# Patient Record
Sex: FEMALE | Race: AMERICAN INDIAN OR ALASKA NATIVE | NOT HISPANIC OR LATINO | Employment: FULL TIME | URBAN - METROPOLITAN AREA
[De-identification: names, ages, dates, MRNs, and addresses within clinical notes are randomized per-mention and may not be internally consistent; named-entity substitution may affect disease eponyms.]

---

## 2018-01-19 ENCOUNTER — APPOINTMENT (OUTPATIENT)
Dept: SOCIAL WORK | Facility: CLINIC | Age: 43
End: 2018-01-19
Payer: COMMERCIAL

## 2018-01-19 ENCOUNTER — HOSPITAL ENCOUNTER (OUTPATIENT)
Facility: MEDICAL CENTER | Age: 43
End: 2018-01-19
Payer: COMMERCIAL

## 2018-01-19 LAB
ALBUMIN SERPL BCP-MCNC: 4.3 G/DL (ref 3.2–4.9)
ALBUMIN/GLOB SERPL: 1.7 G/DL
ALP SERPL-CCNC: 38 U/L (ref 30–99)
ALT SERPL-CCNC: 22 U/L (ref 2–50)
ANION GAP SERPL CALC-SCNC: 5 MMOL/L (ref 0–11.9)
AST SERPL-CCNC: 24 U/L (ref 12–45)
BDY FAT % MEASURED: 18 %
BILIRUB SERPL-MCNC: 0.4 MG/DL (ref 0.1–1.5)
BP DIAS: 60 MMHG
BP SYS: 102 MMHG
BUN SERPL-MCNC: 11 MG/DL (ref 8–22)
CALCIUM SERPL-MCNC: 8.5 MG/DL (ref 8.5–10.5)
CHLORIDE SERPL-SCNC: 105 MMOL/L (ref 96–112)
CHOLEST SERPL-MCNC: 187 MG/DL (ref 100–199)
CO2 SERPL-SCNC: 27 MMOL/L (ref 20–33)
CREAT SERPL-MCNC: 0.81 MG/DL (ref 0.5–1.4)
DIABETES HTDIA: NO
EVENT NAME HTEVT: NORMAL
GLOBULIN SER CALC-MCNC: 2.6 G/DL (ref 1.9–3.5)
GLUCOSE SERPL-MCNC: 90 MG/DL (ref 65–99)
HDLC SERPL-MCNC: 48 MG/DL
HYPERTENSION HTHYP: NO
LDLC SERPL CALC-MCNC: 121 MG/DL
POTASSIUM SERPL-SCNC: 4.3 MMOL/L (ref 3.6–5.5)
PROT SERPL-MCNC: 6.9 G/DL (ref 6–8.2)
SCREENING LOC CITY HTCIT: NORMAL
SCREENING LOC STATE HTSTA: NORMAL
SCREENING LOCATION HTLOC: NORMAL
SODIUM SERPL-SCNC: 137 MMOL/L (ref 135–145)
SUBSCRIBER ID HTSID: NORMAL
TRIGL SERPL-MCNC: 90 MG/DL (ref 0–149)

## 2018-01-19 PROCEDURE — 90471 IMMUNIZATION ADMIN: CPT | Performed by: REGISTERED NURSE

## 2018-01-19 PROCEDURE — 90686 IIV4 VACC NO PRSV 0.5 ML IM: CPT | Performed by: REGISTERED NURSE

## 2018-02-27 ENCOUNTER — APPOINTMENT (OUTPATIENT)
Dept: RADIOLOGY | Facility: IMAGING CENTER | Age: 43
End: 2018-02-27
Attending: PHYSICIAN ASSISTANT
Payer: COMMERCIAL

## 2018-02-27 ENCOUNTER — OCCUPATIONAL MEDICINE (OUTPATIENT)
Dept: URGENT CARE | Facility: CLINIC | Age: 43
End: 2018-02-27
Payer: COMMERCIAL

## 2018-02-27 VITALS
OXYGEN SATURATION: 96 % | DIASTOLIC BLOOD PRESSURE: 70 MMHG | SYSTOLIC BLOOD PRESSURE: 110 MMHG | RESPIRATION RATE: 18 BRPM | HEART RATE: 104 BPM | TEMPERATURE: 98.9 F

## 2018-02-27 DIAGNOSIS — S50.11XA CONTUSION OF RIGHT ELBOW AND FOREARM, INITIAL ENCOUNTER: Primary | ICD-10-CM

## 2018-02-27 DIAGNOSIS — S50.11XA CONTUSION OF RIGHT ELBOW AND FOREARM, INITIAL ENCOUNTER: ICD-10-CM

## 2018-02-27 DIAGNOSIS — S39.012A LUMBOSACRAL STRAIN, INITIAL ENCOUNTER: ICD-10-CM
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
## 2018-02-27 DIAGNOSIS — S39.92XA LUMBOSACRAL INJURY, INITIAL ENCOUNTER: ICD-10-CM
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
## 2018-02-27 PROCEDURE — 72220 X-RAY EXAM SACRUM TAILBONE: CPT | Mod: TC | Performed by: PHYSICIAN ASSISTANT

## 2018-02-27 PROCEDURE — 73080 X-RAY EXAM OF ELBOW: CPT | Mod: TC,RT | Performed by: PHYSICIAN ASSISTANT

## 2018-02-27 PROCEDURE — 99203 OFFICE O/P NEW LOW 30 MIN: CPT | Mod: 29 | Performed by: PHYSICIAN ASSISTANT

## 2018-02-27 PROCEDURE — 72100 X-RAY EXAM L-S SPINE 2/3 VWS: CPT | Mod: TC | Performed by: PHYSICIAN ASSISTANT

## 2018-02-27 NOTE — LETTER
[facility]  [address] [place] [address]  Phone:  [phone] - Fax:  [phone]   [facility] Progress Report and Disability Certification  Date of Service: 2/27/2018   No Show:  No  Date / Time of Next Visit: 3/2/2018 @ 1:50 PM    Claim Information   Patient Name: Savita Russell  Claim Number:     Employer: [employer]  Date of Injury: 2/27/2018     Insurer / TPA: [employer]  ID / SSN:     Occupation: Facilitator   Diagnosis: The primary encounter diagnosis was Contusion of right elbow and forearm, initial encounter. Diagnoses of Lumbosacral injury, initial encounter, Fall due to slipping on ice or snow, initial encounter, and Work related injury were also pertinent to this visit.    Medical Information   Related to Industrial Injury? Yes  Comments:Pt fell on ice in work parking lot causing injury    Subjective Complaints:  Date of Injury: 2/27/2018    Hours Injury: 6:45 AM    Pt states while getting out of her truck in her work parking lot to go into work she fell on ice and injured her right elbow, lower back and tailbone regions. Pt states the fall was mechanical due to the ice and no head or neck injuries or LOC. Pt has not taken any Rx medications for this condition. Pt states the pain is a 7/10, aching in nature and worse right now. Pt denies CP, SOB, NVD, paresthesias,  dizziness, change in vision, hives, or other joint pain. The pt's medication list, problem list, and allergies have been evaluated and reviewed during today's visit. Pt denies second job.     Objective Findings: Right elbow: Upon inspection, mild ecchymoses, mild edema, no erythema. +TTP about olecranon region, +AROM, +SILT, STR 5/5, pulses 2+ B/L   Lower back: Upon inspection, no ecchymoses, no edema, no erythema. +TTP about paraspinal muscles of lumbar region and coccyx pain with palpation, BLE: +AROM, +SILT, STR 5/5, DP 2+ B/L        Pre-Existing  Condition(s):     Assessment:   Initial Visit    Status: Additional Care Required  Permanent Disability:No    Plan: Medication  Comments:OTC ibuprofen    Diagnostics: X-ray  Comments:NEG    Comments:       Disability Information   Status: Released to Restricted Duty    From:  2018  Through: 3/2/2018 Restrictions are:     Physical Restrictions   Sitting:    Standing:  < or = to 4 hrs/day Stooping:    Bending:      Squattin hrs/day Walking:  < or = to 4 hrs/day Climbin hrs/day Pushin hrs/day  Comments:right arm only   Pullin hrs/day  Comments:right arm only Other:    Reaching Above Shoulder (L):   Reaching Above Shoulder (R):       Reaching Below Shoulder (L):    Reaching Below Shoulder (R):      Not to exceed Weight Limits   Carrying(hrs):   Weight Limit(lb): < or = to 10 pounds  Comments:right arm only Lifting(hrs):   Weight  Limit(lb): < or = to 10 pounds  Comments:right arm only   Comments: 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 Pt to take breaks if walking or standing more than 1 hour at a time.    Repetitive Actions   Hands: i.e. Fine Manipulations from Grasping: < or = to 2 hrs/day  Comments:right arm only   Feet: i.e. Operating Foot Controls:     Driving / Operate Machinery:     Physician Name: Leela Willett P.A.-C. Physician Signature: LEELA Mehta P.A.-C. e-Signature: Dr. Phu Mirza, Medical Director   Clinic Name / Location: [address] [address] Clinic Phone Number: Dept: [phone]   Appointment Time: 9:45 Am Visit Start Time: 9:59 AM   Check-In Time:  9:45 Am Visit Discharge Time: 10:57 AM    Original-Treating Physician or Chiropractor    Page 2-Insurer/TPA    Page 3-Employer    Page 4-Employee

## 2018-02-27 NOTE — PROGRESS NOTES
Subjective:      Pt is a 42 y.o. female who presents with Fall (NEW WC DOI 2/27/2018 lower back and (R) elbow)      Date of Injury: 2/27/2018    Hours Injury: 6:45 AM    Pt states while getting out of her truck in her work parking lot to go into work she fell on ice and injured her right elbow, lower back and tailbone regions. Pt states the fall was mechanical due to the ice and no head or neck injuries or LOC. Pt has not taken any Rx medications for this condition. Pt states the pain is a 7/10, aching in nature and worse right now. Pt denies CP, SOB, NVD, paresthesias,  dizziness, change in vision, hives, or other joint pain. The pt's medication list, problem list, and allergies have been evaluated and reviewed during today's visit.   Pt denies second job.     HPI  PMH:  Negative per pt.      PSH:  Negative per pt.       Fam Hx:  the patient's family history is not pertinent to their current complaint      Soc HX:  Social History     Social History   • Marital status: Unknown     Spouse name: N/A   • Number of children: N/A   • Years of education: N/A     Occupational History   • Not on file.     Social History Main Topics   • Smoking status: Not on file   • Smokeless tobacco: Not on file   • Alcohol use Not on file   • Drug use: Unknown   • Sexual activity: Not on file     Other Topics Concern   • Not on file     Social History Narrative   • No narrative on file         Medications:  No current outpatient prescriptions on file.      Allergies:  Patient has no known allergies.    ROS    Constitutional: Negative for fever, chills and malaise/fatigue.   HENT: Negative for congestion and sore throat.    Eyes: Negative for blurred vision, double vision and photophobia.   Respiratory: Negative for cough and shortness of breath.    Cardiovascular: Negative for chest pain and palpitations.   Gastrointestinal: Negative for heartburn, nausea, vomiting, abdominal pain, diarrhea and constipation.   Genitourinary: Negative for  dysuria and flank pain.   Musculoskeletal: POS for right elbow and lower back joint pain and myalgias.   Skin: Negative for itching and rash.   Neurological: Negative for dizziness, tingling and headaches.   Endo/Heme/Allergies: Does not bruise/bleed easily.   Psychiatric/Behavioral: Negative for depression. The patient is not nervous/anxious.         Objective:     /70   Pulse (!) 104   Temp 37.2 °C (98.9 °F)   Resp 18   SpO2 96%      Physical Exam    Right elbow: Upon inspection, mild ecchymoses, mild edema, no erythema. +TTP about olecranon region, +AROM, +SILT, STR 5/5, pulses 2+ B/L   Lower back: Upon inspection, no ecchymoses, no edema, no erythema. +TTP about paraspinal muscles of lumbar region and coccyx pain with palpation, BLE: +AROM, +SILT, STR 5/5, DP 2+ B/L       Constitutional: PT is oriented to person, place, and time. PT appears well-developed and well-nourished. No distress.   HENT:   Head: Normocephalic and atraumatic.   Mouth/Throat: Oropharynx is clear and moist. No oropharyngeal exudate.   Eyes: Conjunctivae normal and EOM are normal. Pupils are equal, round, and reactive to light.   Neck: Normal range of motion. Neck supple. No thyromegaly present.   Cardiovascular: Normal rate, regular rhythm, normal heart sounds and intact distal pulses.  Exam reveals no gallop and no friction rub.    No murmur heard.  Pulmonary/Chest: Effort normal and breath sounds normal. No respiratory distress. PT has no wheezes. PT has no rales. Pt exhibits no tenderness.   Abdominal: Soft. Bowel sounds are normal. PT exhibits no distension and no mass. There is no tenderness. There is no rebound and no guarding.   Neurological: PT is alert and oriented to person, place, and time. PT has normal reflexes. No cranial nerve deficit.   Skin: Skin is warm and dry. No rash noted. PT is not diaphoretic. No erythema.       Psychiatric: PT has a normal mood and affect. PT behavior is normal. Judgment and thought  content normal.      RADS:  Narrative     2/27/2018 10:07 AM    HISTORY/REASON FOR EXAM:  Pelvic pain. Slip and fall on ice.      TECHNIQUE/EXAM DESCRIPTION AND NUMBER OF VIEWS:  3 views of the sacrum and coccyx.    COMPARISON: None.    FINDINGS:  There are no fractures or dislocations.  No blastic or lytic lesions.  The sacral neural arches are intact. There are pelvic phleboliths.   Impression     Negative sacrum and coccyx exam.      Reading Provider Reading Date   Leon Soler M.D. Feb 27, 2018      Signing Provider Signing Date Signing Time   Leon Soler M.D. Feb 27, 2018 10:42 AM   Narrative     2/27/2018 10:07 AM    HISTORY/REASON FOR EXAM:  Pain Following Trauma  Fall on ice    TECHNIQUE/ EXAM DESCRIPTION AND NUMBER OF VIEWS:  3 views of the lumbar spine.    COMPARISON: None.    FINDINGS:  There are 5 nonrib-bearing lumbar vertebra. No compression fracture is identified. There is mild intervertebral disc space narrowing at L5/S1. There is minimal endplate spurring. Alignment of the facet joints is maintained. SI joints are symmetric.   Calcific densities in the pelvis likely represent phleboliths.   Impression     No fracture or subluxation is identified.    Minimal degenerative changes.      Reading Provider Reading Date   Shaye Kim M.D. Feb 27, 2018      Signing Provider Signing Date Signing Time   Shaye Kim M.D. Feb 27, 2018 10:41 AM   Narrative     2/27/2018 10:07 AM    HISTORY/REASON FOR EXAM: Right sided elbow pain. Slip and fall on ice.      TECHNIQUE/EXAM DESCRIPTION AND NUMBER OF VIEWS:  3 views of the RIGHT elbow.    COMPARISON:    FINDINGS: Bone mineralization is normal. There is no evidence of fracture or dislocation. Soft tissues are normal. There is a bone island seen involving the distal humeral diaphysis.   Impression     No evidence of acute fracture or dislocation.      Reading Provider Reading Date   Leon Soler M.D. Feb 27, 2018      Signing Provider Signing Date  Signing Time   Leon Soler M.D. Feb 27, 2018 10:40 AM         Assessment/Plan:     1. Contusion of right elbow and forearm, initial encounter    - DX-ELBOW-COMPLETE 3+ RIGHT; Future    2. Lumbosacral strain    - DX-SACRUM AND COCCYX 2+; Future  - DX-LUMBAR SPINE-2 OR 3 VIEWS; Future    3. Fall due to slipping on ice or snow, initial encounter    - DX-SACRUM AND COCCYX 2+; Future  - DX-LUMBAR SPINE-2 OR 3 VIEWS; Future  - DX-ELBOW-COMPLETE 3+ RIGHT; Future    4. Work related injury    - DX-SACRUM AND COCCYX 2+; Future  - DX-LUMBAR SPINE-2 OR 3 VIEWS; Future  - DX-ELBOW-COMPLETE 3+ RIGHT; Future    Ace wrap to right elbow region for compression  RICE therapy discussed  Gentle ROM exercises discussed  WBAT RUE  Ice/heat therapy discussed  OTC ibuprofen for pain control  Rest, fluids encouraged.  AVS with medical info given.  Pt was in full understanding and agreement with the plan.  Follow-up 3 days for next WC appt

## 2018-02-27 NOTE — LETTER
EMPLOYEE’S CLAIM FOR COMPENSATION/ REPORT OF INITIAL TREATMENT  FORM C-4    EMPLOYEE’S CLAIM - PROVIDE ALL INFORMATION REQUESTED   First Name  Savita Last Name  Drew Birthdate                    1975                Sex  female Claim Number   Home Address  [address] Age  42 y.o. 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 (if applicable)  Slipped and fell on ice    How did this injury or occupational disease occur? (Be specific an answer in detail. Use additional sheet if necessary)  Slipped and fell on ice    If you believe that you have an occupational disease, when did you first have knowledge of the disability and it relationship to your employment?  n/a Witnesses to the Accident  n/a      Nature of Injury or Occupational Disease  Defer  Part(s) of Body Injured or Affected  Elbow (R), Buttocks, Lower Back Area (Lumbar Area & Lumbo-Sacral)    I certify that the above is true and correct to the best of my knowledge and that I have provided this information in order to obtain the benefits of [place]  I hereby authorize any physician, chiropractor, surgeon, practitioner, or other person, any hospital, including [facility] or [facility] hospital, any medical service organization, any insurance company, or other institution or organization to release to each other, any medical or other information, including benefits paid or payable, pertinent to this injury or disease, except information relative to diagnosis, treatment and/or counseling for AIDS, psychological conditions, alcohol or controlled substances, for which I must give specific authorization.  A Photostat of this authorization shall be as valid as the original.     Date   Place   Employee’s Signature   THIS REPORT MUST BE COMPLETED AND MAILED WITHIN 3 WORKING DAYS OF TREATMENT   Place  [facility]  Name of Facility  [facility]   Date  2/27/2018 Diagnosis  (S50.11XA) Contusion of right elbow and forearm, initial encounter  (primary encounter diagnosis)  (S39.92XA) Lumbosacral injury, initial encounter  (W00.9XXA) Fall due to slipping on ice or snow, initial encounter  (Y99.0) Work related injury Is there evidence the injured employee was under the influence of alcohol and/or another controlled substance at the time of accident?   Hour  9:59 AM Description of Injury or Disease  The primary encounter diagnosis was Contusion of right elbow and forearm, initial encounter. Diagnoses of Lumbosacral injury, initial encounter, Fall due to slipping on ice or snow, initial encounter, and Work related injury were also pertinent to this visit. No   Treatment  Rest, ice, gentle ROM exercises, Ace wrap to right elbow, OTC ibuprofen  Have you advised the patient to remain off work five days or more? No   X-Ray Findings  Negative   If Yes   From Date  To Date      From information given by the employee, together with medical evidence, can you directly connect this injury or  "occupational disease as job incurred?  Yes  Comments:pt slipped on ice in work parking lot If No Full Duty  No Modified Duty  Yes   Is additional medical care by a physician indicated?  Yes If Modified Duty, Specify any Limitations / Restrictions  SEE RESTRICTIONS   Do you know of any previous injury or disease contributing to this condition or occupational disease?                            No   Date  2/27/2018 Print Doctor’s Name Leela Willett P.A.-C. I certify the employer’s copy of  this form was mailed on:   Address  [address]     [place] Zip  [address]    Provider’s Tax ID Number  [id number] Telephone  Dept: [phone]        e-LEELA Maharaj P.A.-C.   e-Signature: Dr. Phu Mirza, Medical Director Degree  [employer]        ORIGINAL-TREATING PHYSICIAN OR CHIROPRACTOR    PAGE 2-INSURER/TPA    PAGE 3-EMPLOYER    PAGE 4-EMPLOYEE             Form C-4 (rev10/07)              BRIEF DESCRIPTION OF RIGHTS AND BENEFITS  (Pursuant to [place]RS 616C.050)    Notice of Injury or Occupational Disease (Incident Report Form C-1): If an injury or occupational disease (OD) arises out of and in the  course of employment, you must provide written notice to your employer as soon as practicable, but no later than 7 days after the accident or  OD. Your employer shall maintain a sufficient supply of the required forms.    Claim for Compensation (Form C-4): If medical treatment is sought, the form C-4 is available at the place of initial treatment. A completed  \"Claim for Compensation\" (Form C-4) must be filed within 90 days after an accident or OD. The treating physician or chiropractor must,  within 3 working days after treatment, complete and mail to the employer, the employer's insurer and third-party , the Claim for  Compensation.    Medical Treatment: If you require medical treatment for your on-the-job injury or OD, you may be required to select a physician " or  chiropractor from a list provided by your workers’ compensation insurer, if it has contracted with an Organization for Managed Care (MCO) or  Preferred Provider Organization (PPO) or providers of health care. If your employer has not entered into a contract with an MCO or PPO, you  may select a physician or chiropractor from the Panel of Physicians and Chiropractors. Any medical costs related to your industrial injury or  OD will be paid by your insurer.    Temporary Total Disability (TTD): If your doctor has certified that you are unable to work for a period of at least 5 consecutive days, or 5  cumulative days in a 20-day period, or places restrictions on you that your employer does not accommodate, you may be entitled to TTD  compensation.    Temporary Partial Disability (TPD): If the wage you receive upon reemployment is less than the compensation for TTD to which you are  entitled, the insurer may be required to pay you TPD compensation to make up the difference. TPD can only be paid for a maximum of 24  months.    Permanent Partial Disability (PPD): When your medical condition is stable and there is an indication of a PPD as a result of your injury or  OD, within 30 days, your insurer must arrange for an evaluation by a rating physician or chiropractor to determine the degree of your PPD. The  amount of your PPD award depends on the date of injury, the results of the PPD evaluation and your age and wage.    Permanent Total Disability (PTD): If you are medically certified by a treating physician or chiropractor as permanently and totally disabled  and have been granted a PTD status by your insurer, you are entitled to receive monthly benefits not to exceed 66 2/3% of your average  monthly wage. The amount of your PTD payments is subject to reduction if you previously received a PPD award.    Vocational Rehabilitation Services: You may be eligible for vocational rehabilitation services if you are unable to  return to the job due to a  permanent physical impairment or permanent restrictions as a result of your injury or occupational disease.    Transportation and Per Sherie Reimbursement: You may be eligible for travel expenses and per sherie associated with medical treatment.    Reopening: You may be able to reopen your claim if your condition worsens after claim closure.    Appeal Process: If you disagree with a written determination issued by the insurer or the insurer does not respond to your request, you may  appeal to the Department of Administration, , by following the instructions contained in your determination letter. You must  appeal the determination within 70 days from the date of the determination letter at [address], [address], [place], [place]  [address], or [address], [place], [place] [address]. If you disagree with the  decision, you may appeal to the  Department of Administration, . You must file your appeal within 30 days from the date of the  decision  letter at [address], [address], [place], [place] [address], or [address] [facility] [address], [place] [address]. If you  disagree with a decision of an , you may file a petition for judicial review with the District Court. You must do so within 30  days of the Appeal Officer’s decision. You may be represented by an  at your own expense or you may contact the [facility] for possible  representation.    [place]  for Injured Workers (NAIW): If you disagree with a  decision, you may request that NAIW represent you  without charge at an  Hearing. For information regarding denial of benefits, you may contact the [facility] at: [address], [address], [place] [address], [phone], or [address] [facility] [address], [place] [address], [phone]    To File a Complaint with the  Division: If you wish to file a complaint with the  of the Division of Industrial Relations (DIR),  please contact the Workers’ Compensation Section, [address], [address], [place], [place] [address], telephone ([phone]) [phone], or  [address], [address], [place], [place] [address], telephone [phone].    For assistance with Workers’ Compensation Issues: you may contact the Office of the Governor Consumer Health Assistance, [address] [place] [address], [address], [place], [place] [address], Toll Free 1-543.110.6560, Web site: http://[employer].nv.us, E-mail  [email or web address].[email or web address].                                                                                                                                                                                                                                   __________________________________________________________________                                                                   _________________                Employee Name / Signature                                                                                                                                                       Date                                                                                                                                                                                                     D-2 (rev. 10/07)

## 2018-02-27 NOTE — LETTER
[facility]  [address] [address]  Phone:  [phone] - Fax:  [phone]   [facility] Progress Report and Disability Certification  Date of Service: 2/27/2018   No Show:  No  Date / Time of Next Visit: 3/2/2018   Claim Information   Patient Name: Savita Russell  Claim Number:     Employer: [employer] *** Date of Injury: 2/27/2018     Insurer / TPA: [employer] *** ID / SSN:     Occupation: Facilitator  *** Diagnosis: The primary encounter diagnosis was Contusion of right elbow and forearm, initial encounter. Diagnoses of Lumbosacral strain, initial encounter, Fall due to slipping on ice or snow, initial encounter, and Work related injury were also pertinent to this visit.    Medical Information   Related to Industrial Injury? Yes  Comments:Pt fell on ice in work parking lot causing injury ***   Subjective Complaints:  Date of Injury: 2/27/2018    Hours Injury: 6:45 AM    Pt states while getting out of her truck in her work parking lot to go into work she fell on ice and injured her right elbow, lower back and tailbone regions. Pt states the fall was mechanical due to the ice and no head or neck injuries or LOC. Pt has not taken any Rx medications for this condition. Pt states the pain is a 7/10, aching in nature and worse right now. Pt denies CP, SOB, NVD, paresthesias,  dizziness, change in vision, hives, or other joint pain. The pt's medication list, problem list, and allergies have been evaluated and reviewed during today's visit. Pt denies second job.     Objective Findings: Right elbow: Upon inspection, mild ecchymoses, mild edema, no erythema. +TTP about olecranon region, +AROM, +SILT, STR 5/5, pulses 2+ B/L   Lower back: Upon inspection, no ecchymoses, no edema, no erythema. +TTP about paraspinal muscles of lumbar region and coccyx pain with palpation, BLE: +AROM, +SILT, STR 5/5, DP 2+ B/L        Pre-Existing  Condition(s):     Assessment:   Initial Visit    Status: Additional Care Required  Permanent Disability:No    Plan: Medication  Comments:OTC ibuprofen    Diagnostics: X-ray  Comments:NEG    Comments:       Disability Information   Status: Released to Restricted Duty    From:  2018  Through: 3/2/2018 Restrictions are:     Physical Restrictions   Sitting:    Standing:  < or = to 4 hrs/day Stooping:    Bending:      Squattin hrs/day Walking:  < or = to 4 hrs/day Climbin hrs/day Pushin hrs/day  Comments:right arm only   Pullin hrs/day  Comments:right arm only Other:    Reaching Above Shoulder (L):   Reaching Above Shoulder (R):       Reaching Below Shoulder (L):    Reaching Below Shoulder (R):      Not to exceed Weight Limits   Carrying(hrs):   Weight Limit(lb): < or = to 10 pounds  Comments:right arm only Lifting(hrs):   Weight  Limit(lb): < or = to 10 pounds  Comments:right arm only   Comments: 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 Pt to take breaks if walking or standing more than 1 hour at a time.    Repetitive Actions   Hands: i.e. Fine Manipulations from Grasping: < or = to 2 hrs/day  Comments:right arm only   Feet: i.e. Operating Foot Controls:     Driving / Operate Machinery:     Physician Name: Leela Willett P.A.-C. Physician Signature: LEELA Mehta P.A.-C. e-Signature: Dr. Phu Mirza, Medical Director   Clinic Name / Location: [address] [address] Clinic Phone Number: Dept: [phone]   Appointment Time: 9:45 Am Visit Start Time: 9:59 AM   Check-In Time:  9:45 Am Visit Discharge Time: 2:14 Pm ***   Original-Treating Physician or Chiropractor    Page 2-Insurer/TPA    Page 3-Employer    Page 4-Employee

## 2018-03-01 ENCOUNTER — OFFICE VISIT (OUTPATIENT)
Dept: MEDICAL GROUP | Facility: MEDICAL CENTER | Age: 43
End: 2018-03-01
Payer: COMMERCIAL

## 2018-03-01 VITALS
DIASTOLIC BLOOD PRESSURE: 78 MMHG | RESPIRATION RATE: 16 BRPM | HEART RATE: 94 BPM | SYSTOLIC BLOOD PRESSURE: 114 MMHG | OXYGEN SATURATION: 98 % | WEIGHT: 118.8 LBS | TEMPERATURE: 99.1 F

## 2018-03-01 DIAGNOSIS — G43.709 CHRONIC MIGRAINE WITHOUT AURA WITHOUT STATUS MIGRAINOSUS, NOT INTRACTABLE: ICD-10-CM

## 2018-03-01 DIAGNOSIS — K58.0 IRRITABLE BOWEL SYNDROME WITH DIARRHEA: ICD-10-CM

## 2018-03-01 DIAGNOSIS — Z76.89 ENCOUNTER TO ESTABLISH CARE: ICD-10-CM

## 2018-03-01 DIAGNOSIS — Z80.3 FAMILY HISTORY OF BREAST CANCER: ICD-10-CM

## 2018-03-01 DIAGNOSIS — Z80.41 FAMILY HISTORY OF OVARIAN CANCER: ICD-10-CM

## 2018-03-01 DIAGNOSIS — Z00.00 PREVENTATIVE HEALTH CARE: ICD-10-CM

## 2018-03-01 PROCEDURE — 99204 OFFICE O/P NEW MOD 45 MIN: CPT | Performed by: PHYSICIAN ASSISTANT

## 2018-03-01 RX ORDER — IBUPROFEN 800 MG/1
800 TABLET ORAL EVERY 8 HOURS PRN
Qty: 40 TAB | Refills: 1 | Status: SHIPPED | OUTPATIENT
Start: 2018-03-01 | End: 2018-07-23 | Stop reason: SDUPTHER

## 2018-03-01 RX ORDER — SUMATRIPTAN 100 MG/1
100 TABLET, FILM COATED ORAL
Qty: 10 TAB | Refills: 11 | Status: SHIPPED | OUTPATIENT
Start: 2018-03-01

## 2018-03-02 DIAGNOSIS — F51.01 PRIMARY INSOMNIA: ICD-10-CM

## 2018-03-02 RX ORDER — TRAZODONE HYDROCHLORIDE 50 MG/1
50 TABLET ORAL
Qty: 30 TAB | Refills: 3 | Status: SHIPPED | OUTPATIENT
Start: 2018-03-02 | End: 2018-11-13

## 2018-03-02 NOTE — ASSESSMENT & PLAN NOTE
This is a 42-year-old female accompanied by her , Toy. She is here today to establish care. She has a chronic history of migraines. Migraines may be associated with an aura or she may have vertigo with the room flips. She has a history of taking Imitrex 100 mg as needed. Medication is not 100% effective. In the past she was on Topamax 50 mg twice a day but the medication had side effects. She and her  recently moved here from [place]. They live in [place]. They're originally from [place].

## 2018-03-02 NOTE — ASSESSMENT & PLAN NOTE
She also complains of a chronic history of IBS. Usually symptoms began after she eats. She made once a day. Has diarrhea. Denies any abdominal pain. Refuses to get a colonoscopy.

## 2018-03-02 NOTE — ASSESSMENT & PLAN NOTE
Mother had breast cancer as well as other cancers among the female side. Sister had ovarian cancer. Mother also had ovarian cancer. BRCA testing is unknown. She refuses to get a mammogram as well as Pap smears. She states she has fibrocystic changes in her breast and mammograms are painful.

## 2018-03-02 NOTE — PROGRESS NOTES
Subjective:   Savita Russell is a 42 y.o. female here today for establishing care and chronic migraines.    Chronic migraine without aura without status migrainosus, not intractable  This is a 42-year-old female accompanied by her , Toy. She is here today to establish care. She has a chronic history of migraines. Migraines may be associated with an aura or she may have vertigo with the room flips. She has a history of taking Imitrex 100 mg as needed. Medication is not 100% effective. In the past she was on Topamax 50 mg twice a day but the medication had side effects. She and her  recently moved here from [place]. They live in [place]. They're originally from [place].    Irritable bowel syndrome with diarrhea  She also complains of a chronic history of IBS. Usually symptoms began after she eats. She made once a day. Has diarrhea. Denies any abdominal pain. Refuses to get a colonoscopy.    Family history of breast cancer  Mother had breast cancer as well as other cancers among the female side. Sister had ovarian cancer. Mother also had ovarian cancer. BRCA testing is unknown. She refuses to get a mammogram as well as Pap smears. She states she has fibrocystic changes in her breast and mammograms are painful.       Current medicines (including changes today)  Current Outpatient Prescriptions   Medication Sig Dispense Refill   • sumatriptan (IMITREX) 100 MG tablet Take 1 Tab by mouth Once PRN for Migraine for up to 1 dose. 10 Tab 11   • ibuprofen (MOTRIN) 800 MG Tab Take 1 Tab by mouth every 8 hours as needed. With food. 40 Tab 1     No current facility-administered medications for this visit.      She  has no past medical history on file.    Social History and Family History were reviewed and updated.    ROS   No chest pain, no shortness of breath, no abdominal pain and all other systems were reviewed and are negative.       Objective:     Blood pressure 114/78, pulse 94, temperature 37.3 °C  (99.1 °F), resp. rate 16, weight 53.9 kg (118 lb 12.8 oz), SpO2 98 %. There is no height or weight on file to calculate BMI.   Physical Exam:  Constitutional: Alert, no distress.  Skin: Warm, dry, good turgor, no rashes in visible areas.  Eye: Equal, round and reactive, conjunctiva clear, lids normal.  ENMT: Lips without lesions, good dentition, oropharynx clear.  Neck: Trachea midline, no masses.   Lymph: No cervical or supraclavicular lymphadenopathy  Respiratory: Unlabored respiratory effort, lungs appear clear, no wheezes.  Cardiovascular: Normal S1, S2, no murmur, no edema.  Psych: Alert and oriented x3, normal affect and mood.        Assessment and Plan:   The following treatment plan was discussed    1. Chronic migraine without aura without status migrainosus, not intractable  Chronic condition. Provided prescription for Imitrex 100 mg as directed. Also given a prescription for ibuprofen with food as needed.  - sumatriptan (IMITREX) 100 MG tablet; Take 1 Tab by mouth Once PRN for Migraine for up to 1 dose.  Dispense: 10 Tab; Refill: 11  - ibuprofen (MOTRIN) 800 MG Tab; Take 1 Tab by mouth every 8 hours as needed. With food.  Dispense: 40 Tab; Refill: 1    2. Irritable bowel syndrome with diarrhea  Chronic condition. Stable. We'll monitor.    3. Family history of ovarian cancer  Order BRCA1 and BRCA2 testing. Advised to contact insurance first about coverage.  - BRCA1 and BRCA2) Sequencing and Deletion/Duplication; Future    4. Family history of breast cancer  Ordered BRCA1 and BRCA2 testing. Advised to contact insurance first about coverage.  - BRCA1 and BRCA2) Sequencing and Deletion/Duplication; Future    5. Preventative health care  Ordered mammogram.  - MA-SCREEN MAMMO W/CAD-BILAT; Future    6. Encounter to establish care      Followup: Return if symptoms worsen or fail to improve.    Please note that this dictation was created using voice recognition software. I have made every reasonable attempt to  correct obvious errors, but I expect that there are errors of grammar and possibly content that I did not discover before finalizing the note.

## 2018-03-03 ENCOUNTER — OCCUPATIONAL MEDICINE (OUTPATIENT)
Dept: URGENT CARE | Facility: CLINIC | Age: 43
End: 2018-03-03
Payer: COMMERCIAL

## 2018-03-03 VITALS
SYSTOLIC BLOOD PRESSURE: 118 MMHG | HEIGHT: 65 IN | DIASTOLIC BLOOD PRESSURE: 62 MMHG | BODY MASS INDEX: 19.66 KG/M2 | OXYGEN SATURATION: 97 % | WEIGHT: 118 LBS | HEART RATE: 70 BPM | TEMPERATURE: 99.4 F
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
## 2018-03-03 PROCEDURE — 99202 OFFICE O/P NEW SF 15 MIN: CPT | Performed by: FAMILY MEDICINE

## 2018-03-03 NOTE — LETTER
[facility]  [address] [place] [address]  Phone:  [phone] - Fax:  [phone]   [facility] Progress Report and Disability Certification  Date of Service: 3/3/2018   No Show:  No  Date / Time of Next Visit:  MMI   Claim Information   Patient Name: Savita Russell  Claim Number:     Employer: [employer]  Date of Injury: 2/27/2018     Insurer / TPA: [employer]  ID / SSN:     Occupation: Facilitator   Diagnosis: The encounter diagnosis was Fall due to ice or snow, subsequent encounter.    Medical Information   Related to Industrial Injury? Yes    Subjective Complaints:  Date of Injury: 2/27/2018   Hours Injury: 6:45 AM  Pt states while getting out of her truck in her work parking lot to go into work she fell on ice and injured her right elbow, lower back and tailbone regions. Pt states the fall was mechanical due to the ice and no head or neck injuries or LOC. Pt has not taken any Rx medications for this condition. Pt states the pain is a 7/10, aching in nature and worse right now. Pt denies CP, SOB, NVD, paresthesias,  dizziness, change in vision, hives, or other joint pain. The pt's medication list, problem list, and allergies have been evaluated and reviewed during today's visit.  Pt denies second job.   Follow-up 3/3/2018 patient endorse complete resolution of pain, stiffness. Denies any competitions from injuries at this time. Patient would like to return to work.   Objective Findings: Full range of motion bilateral upper extremities. Neurovascular intact bilateral upper extremities. Full range of motion throughout back. No tenderness to palpation bilateral upper extremities or back.    Pre-Existing Condition(s):     Assessment:   Condition Improved    Status: Discharged /  MMI  Permanent Disability:No    Plan:      Diagnostics:      Comments:       Disability Information   Status: Released to Full Duty    From:        Through:   Restrictions are:     Physical Restrictions   Sitting:    Standing:    Stooping:    Bending:      Squatting:    Walking:    Climbing:    Pushing:      Pulling:    Other:    Reaching Above Shoulder (L):   Reaching Above Shoulder (R):       Reaching Below Shoulder (L):    Reaching Below Shoulder (R):      Not to exceed Weight Limits   Carrying(hrs):   Weight Limit(lb):   Lifting(hrs):   Weight  Limit(lb):     Comments:      Repetitive Actions   Hands: i.e. Fine Manipulations from Grasping:     Feet: i.e. Operating Foot Controls:     Driving / Operate Machinery:     Physician Name: [facility] Physician Signature: MARCELLE Ashby M.D. e-Signature: Dr. Phu Mirza, Medical Director   Clinic Name / Location: [address] [place] [address] Clinic Phone Number: Dept: [phone]   Appointment Time: 2:00 Pm Visit Start Time: 2:12 PM   Check-In Time:  1:54 Pm Visit Discharge Time:  2:41 PM   Original-Treating Physician or Chiropractor    Page 2-Insurer/TPA    Page 3-Employer    Page 4-Employee

## 2018-03-10 NOTE — PROGRESS NOTES
"Subjective:   Savita Russell is a 42 y.o. female who presents for Follow-Up ( follow up for R elbow and back, feels much better would like to be cleared to go back to work )    Date of Injury: 2/27/2018   Hours Injury: 6:45 AM  Pt states while getting out of her truck in her work parking lot to go into work she fell on ice and injured her right elbow, lower back and tailbone regions. Pt states the fall was mechanical due to the ice and no head or neck injuries or LOC. Pt has not taken any Rx medications for this condition. Pt states the pain is a 7/10, aching in nature and worse right now. Pt denies CP, SOB, NVD, paresthesias,  dizziness, change in vision, hives, or other joint pain. The pt's medication list, problem list, and allergies have been evaluated and reviewed during today's visit.  Pt denies second job.   Follow-up 3/3/2018 patient endorse complete resolution of pain, stiffness. Denies any competitions from injuries at this time. Patient would like to return to work.   HPI  ROS  Allergies   Allergen Reactions   • Demerol      Flat line   • Morphine      Starts to see things    • Trazodone      Hard time breathing, head aches   • Vicodin [Hydrocodone-Acetaminophen]      Hives, rash       Objective:   /62   Pulse 70   Temp 37.4 °C (99.4 °F)   Ht 1.651 m (5' 5\")   Wt 53.5 kg (118 lb)   SpO2 97%   BMI 19.64 kg/m²   Physical Exam   Constitutional: She is oriented to person, place, and time. She appears well-developed and well-nourished. No distress.   Eyes: EOM are normal. Pupils are equal, round, and reactive to light.   Cardiovascular: Normal rate, regular rhythm, normal heart sounds and intact distal pulses.    Pulmonary/Chest: Effort normal and breath sounds normal. No respiratory distress.   Abdominal: Soft. Bowel sounds are normal. She exhibits no distension.   Musculoskeletal: Normal range of motion.   Neurological: She is alert and oriented to person, place, and time. She has " normal reflexes.   Skin: Skin is warm and dry.   Psychiatric: She has a normal mood and affect. Her behavior is normal.     Full range of motion bilateral upper extremities. Neurovascular intact bilateral upper extremities. Full range of motion throughout back. No tenderness to palpation bilateral upper extremities or back.    Assessment/Plan:   Assessment    1. Fall due to ice or snow, subsequent encounter  Differential diagnosis, natural history, supportive care, and indications for immediate follow-up discussed.  Use over-the-counter pain reliever, such as acetaminophen (Tylenol), ibuprofen (Advil, Motrin) or naproxen (Aleve) as needed; follow package directions for dosing.

## 2018-07-23 ENCOUNTER — PATIENT MESSAGE (OUTPATIENT)
Dept: MEDICAL GROUP | Facility: MEDICAL CENTER | Age: 43
End: 2018-07-23

## 2018-07-23 DIAGNOSIS — G43.709 CHRONIC MIGRAINE WITHOUT AURA WITHOUT STATUS MIGRAINOSUS, NOT INTRACTABLE: ICD-10-CM

## 2018-07-23 RX ORDER — IBUPROFEN 800 MG/1
800 TABLET ORAL EVERY 8 HOURS PRN
Qty: 40 TAB | Refills: 1 | Status: SHIPPED | OUTPATIENT
Start: 2018-07-23 | End: 2019-02-03 | Stop reason: SDUPTHER

## 2018-10-05 ENCOUNTER — IMMUNIZATION (OUTPATIENT)
Dept: SOCIAL WORK | Facility: CLINIC | Age: 43
End: 2018-10-05
Payer: COMMERCIAL

## 2018-10-05 DIAGNOSIS — Z23 NEED FOR VACCINATION: ICD-10-CM

## 2018-10-05 PROCEDURE — 90686 IIV4 VACC NO PRSV 0.5 ML IM: CPT | Performed by: REGISTERED NURSE

## 2018-10-05 PROCEDURE — 90471 IMMUNIZATION ADMIN: CPT | Performed by: REGISTERED NURSE

## 2018-10-16 ENCOUNTER — TELEPHONE (OUTPATIENT)
Dept: MEDICAL GROUP | Facility: MEDICAL CENTER | Age: 43
End: 2018-10-16

## 2018-10-23 ENCOUNTER — APPOINTMENT (OUTPATIENT)
Dept: MEDICAL GROUP | Facility: MEDICAL CENTER | Age: 43
End: 2018-10-23
Payer: COMMERCIAL

## 2018-11-13 ENCOUNTER — OFFICE VISIT (OUTPATIENT)
Dept: MEDICAL GROUP | Facility: MEDICAL CENTER | Age: 43
End: 2018-11-13
Payer: COMMERCIAL

## 2018-11-13 VITALS
TEMPERATURE: 99.8 F | WEIGHT: 119 LBS | DIASTOLIC BLOOD PRESSURE: 66 MMHG | BODY MASS INDEX: 19.83 KG/M2 | OXYGEN SATURATION: 97 % | SYSTOLIC BLOOD PRESSURE: 120 MMHG | HEART RATE: 79 BPM | HEIGHT: 65 IN

## 2018-11-13 DIAGNOSIS — F51.01 PRIMARY INSOMNIA: ICD-10-CM

## 2018-11-13 DIAGNOSIS — F17.200 TOBACCO DEPENDENCE: ICD-10-CM

## 2018-11-13 PROCEDURE — 99214 OFFICE O/P EST MOD 30 MIN: CPT | Performed by: PHYSICIAN ASSISTANT

## 2018-11-13 RX ORDER — BUPROPION HYDROCHLORIDE 150 MG/1
150 TABLET, EXTENDED RELEASE ORAL 2 TIMES DAILY
Qty: 60 TAB | Refills: 5 | Status: SHIPPED | OUTPATIENT
Start: 2018-11-13 | End: 2020-06-05 | Stop reason: SDUPTHER

## 2018-11-13 RX ORDER — TEMAZEPAM 15 MG/1
15 CAPSULE ORAL NIGHTLY PRN
Qty: 30 CAP | Refills: 0 | Status: SHIPPED | OUTPATIENT
Start: 2018-11-13 | End: 2018-12-13

## 2018-11-13 ASSESSMENT — PATIENT HEALTH QUESTIONNAIRE - PHQ9
CLINICAL INTERPRETATION OF PHQ2 SCORE: 2
SUM OF ALL RESPONSES TO PHQ QUESTIONS 1-9: 4
5. POOR APPETITE OR OVEREATING: 0 - NOT AT ALL

## 2018-11-14 NOTE — ASSESSMENT & PLAN NOTE
This is a 42-year-old female accompanied by her , Ramesh.  She is here today to discuss insomnia.  Chronic history.  I provided her trazodone and it caused a massive headache.  In the past she was on Ambien 5 mg but that caused side effects.  Currently she is on Unisom and takes multiple tablets before it does any effect.  She may sleep for a couple hours and then wakes up.  She complains of worsening stress over the past few weeks with her  having this chest pain.  Likely cardiac related.

## 2018-11-14 NOTE — PROGRESS NOTES
"Subjective:   Savita Russell is a 42 y.o. female here today for insomnia and tobacco dependence.    Primary insomnia  This is a 42-year-old female accompanied by her , Ramesh.  She is here today to discuss insomnia.  Chronic history.  I provided her trazodone and it caused a massive headache.  In the past she was on Ambien 5 mg but that caused side effects.  Currently she is on Unisom and takes multiple tablets before it does any effect.  She may sleep for a couple hours and then wakes up.  She complains of worsening stress over the past few weeks with her  having this chest pain.  Likely cardiac related.    Tobacco dependence  Chronic history of smoking.  Would like to stop.  She would like to try Wellbutrin.  In the past she was on it and it helped reduce her urge.       Current medicines (including changes today)  Current Outpatient Prescriptions   Medication Sig Dispense Refill   • buPROPion SR (WELLBUTRIN-SR) 150 MG TABLET SR 12 HR sustained-release tablet Take 1 Tab by mouth 2 times a day. First 3 days take one tablet daily. 60 Tab 5   • temazepam (RESTORIL) 15 MG Cap Take 1 Cap by mouth at bedtime as needed for Sleep for up to 30 days. 30 Cap 0   • ibuprofen (MOTRIN) 800 MG Tab Take 1 Tab by mouth every 8 hours as needed. With food. 40 Tab 1   • sumatriptan (IMITREX) 100 MG tablet Take 1 Tab by mouth Once PRN for Migraine for up to 1 dose. 10 Tab 11     No current facility-administered medications for this visit.      She  has no past medical history on file.    Social History and Family History were reviewed and updated.    ROS   No chest pain, no shortness of breath, no abdominal pain and all other systems were reviewed and are negative.       Objective:     Blood pressure 120/66, pulse 79, temperature 37.7 °C (99.8 °F), height 1.651 m (5' 5\"), weight 54 kg (119 lb), SpO2 97 %, not currently breastfeeding. Body mass index is 19.8 kg/m².   Physical Exam:  Constitutional: Alert, no " distress.  Skin: Warm, dry, good turgor, no rashes in visible areas.  Eye: Equal, round and reactive, conjunctiva clear, lids normal.  ENMT: Lips without lesions, good dentition, oropharynx clear.  Neck: Trachea midline, no masses.   Lymph: No cervical or supraclavicular lymphadenopathy  Respiratory: Unlabored respiratory effort, lungs clear to auscultation, no wheezes, no ronchi.  Cardiovascular: Normal S1, S2, no murmur, no edema.  Psych: Alert and oriented x3, normal affect and mood.        Assessment and Plan:   The following treatment plan was discussed    1. Primary insomnia  Chronic condition.  Provided Restoril 15 mg capsules as directed.  Discussed medication may become habit forming.  Referred also to Dr. Gregoria berrios for further evaluation.  Ordered TSH with reflex to free T4.  Must see me in follow-up in 1 month to discuss.  - temazepam (RESTORIL) 15 MG Cap; Take 1 Cap by mouth at bedtime as needed for Sleep for up to 30 days.  Dispense: 30 Cap; Refill: 0  - REFERRAL TO BEHAVIORAL HEALTH  - TSH WITH REFLEX TO FT4; Future    2. Tobacco dependence  Chronic condition.  Wellbutrin 150 mg started as directed.  Follow-up in 1 month.  - buPROPion SR (WELLBUTRIN-SR) 150 MG TABLET SR 12 HR sustained-release tablet; Take 1 Tab by mouth 2 times a day. First 3 days take one tablet daily.  Dispense: 60 Tab; Refill: 5      Followup: Return in about 4 weeks (around 12/11/2018).    Please note that this dictation was created using voice recognition software. I have made every reasonable attempt to correct obvious errors, but I expect that there are errors of grammar and possibly content that I did not discover before finalizing the note.

## 2018-11-14 NOTE — ASSESSMENT & PLAN NOTE
Chronic history of smoking.  Would like to stop.  She would like to try Wellbutrin.  In the past she was on it and it helped reduce her urge.

## 2019-02-03 DIAGNOSIS — G43.709 CHRONIC MIGRAINE WITHOUT AURA WITHOUT STATUS MIGRAINOSUS, NOT INTRACTABLE: ICD-10-CM

## 2019-02-04 RX ORDER — IBUPROFEN 800 MG/1
TABLET ORAL
Qty: 40 TAB | Refills: 0 | Status: SHIPPED | OUTPATIENT
Start: 2019-02-04 | End: 2019-03-22 | Stop reason: SDUPTHER

## 2019-03-22 DIAGNOSIS — G43.709 CHRONIC MIGRAINE WITHOUT AURA WITHOUT STATUS MIGRAINOSUS, NOT INTRACTABLE: ICD-10-CM

## 2019-03-25 RX ORDER — IBUPROFEN 800 MG/1
TABLET ORAL
Qty: 40 TAB | Refills: 0 | Status: SHIPPED | OUTPATIENT
Start: 2019-03-25 | End: 2019-07-07 | Stop reason: SDUPTHER

## 2019-07-07 DIAGNOSIS — G43.709 CHRONIC MIGRAINE WITHOUT AURA WITHOUT STATUS MIGRAINOSUS, NOT INTRACTABLE: ICD-10-CM

## 2019-07-07 RX ORDER — IBUPROFEN 800 MG/1
TABLET ORAL
Qty: 40 TAB | Refills: 0 | Status: SHIPPED | OUTPATIENT
Start: 2019-07-07 | End: 2019-10-16 | Stop reason: SDUPTHER

## 2019-10-16 DIAGNOSIS — G43.709 CHRONIC MIGRAINE WITHOUT AURA WITHOUT STATUS MIGRAINOSUS, NOT INTRACTABLE: ICD-10-CM

## 2019-10-16 RX ORDER — IBUPROFEN 800 MG/1
TABLET ORAL
Qty: 40 TAB | Refills: 1 | Status: SHIPPED | OUTPATIENT
Start: 2019-10-16 | End: 2020-04-12

## 2020-06-05 ENCOUNTER — OFFICE VISIT (OUTPATIENT)
Dept: MEDICAL GROUP | Facility: MEDICAL CENTER | Age: 45
End: 2020-06-05
Payer: COMMERCIAL

## 2020-06-05 VITALS
TEMPERATURE: 99.6 F | HEIGHT: 66 IN | HEART RATE: 85 BPM | DIASTOLIC BLOOD PRESSURE: 66 MMHG | WEIGHT: 117.73 LBS | BODY MASS INDEX: 18.92 KG/M2 | RESPIRATION RATE: 16 BRPM | SYSTOLIC BLOOD PRESSURE: 98 MMHG | OXYGEN SATURATION: 96 %

## 2020-06-05 DIAGNOSIS — F51.01 PRIMARY INSOMNIA: ICD-10-CM

## 2020-06-05 DIAGNOSIS — E78.00 ELEVATED LDL CHOLESTEROL LEVEL: ICD-10-CM

## 2020-06-05 DIAGNOSIS — G43.709 CHRONIC MIGRAINE WITHOUT AURA WITHOUT STATUS MIGRAINOSUS, NOT INTRACTABLE: ICD-10-CM

## 2020-06-05 DIAGNOSIS — N63.10 LUMP OF RIGHT BREAST: ICD-10-CM

## 2020-06-05 DIAGNOSIS — Z76.89 ENCOUNTER TO ESTABLISH CARE: ICD-10-CM

## 2020-06-05 DIAGNOSIS — D22.9 NUMEROUS MOLES: ICD-10-CM

## 2020-06-05 DIAGNOSIS — K13.70 ORAL LESION: ICD-10-CM

## 2020-06-05 DIAGNOSIS — Z80.3 FAMILY HISTORY OF MALIGNANT NEOPLASM OF BREAST: ICD-10-CM

## 2020-06-05 DIAGNOSIS — Z80.41 FAMILY HISTORY OF MALIGNANT NEOPLASM OF OVARY: ICD-10-CM

## 2020-06-05 DIAGNOSIS — F17.200 TOBACCO DEPENDENCE: ICD-10-CM

## 2020-06-05 PROCEDURE — 99214 OFFICE O/P EST MOD 30 MIN: CPT | Performed by: FAMILY MEDICINE

## 2020-06-05 RX ORDER — AMITRIPTYLINE HYDROCHLORIDE 10 MG/1
10 TABLET, FILM COATED ORAL
Qty: 60 TAB | Refills: 0 | Status: SHIPPED
Start: 2020-06-05 | End: 2021-05-05

## 2020-06-05 RX ORDER — BUPROPION HYDROCHLORIDE 150 MG/1
150 TABLET, EXTENDED RELEASE ORAL 2 TIMES DAILY
Qty: 60 TAB | Refills: 5 | Status: SHIPPED
Start: 2020-06-05 | End: 2021-05-05

## 2020-06-05 ASSESSMENT — ENCOUNTER SYMPTOMS
WEIGHT LOSS: 0
CHILLS: 0
EYE REDNESS: 0
INSOMNIA: 1
MYALGIAS: 0
CONSTIPATION: 0
SENSORY CHANGE: 0
WHEEZING: 0
FOCAL WEAKNESS: 0
COUGH: 0
EYE PAIN: 0
NAUSEA: 0
FEVER: 0
SHORTNESS OF BREATH: 0
PALPITATIONS: 0
VOMITING: 0
DEPRESSION: 0
SPUTUM PRODUCTION: 0
DIARRHEA: 0
ABDOMINAL PAIN: 0
DIZZINESS: 0
SINUS PAIN: 0
HEADACHES: 0
HEMOPTYSIS: 0
EYE DISCHARGE: 0
NERVOUS/ANXIOUS: 0

## 2020-06-05 ASSESSMENT — LIFESTYLE VARIABLES: SUBSTANCE_ABUSE: 0

## 2020-06-05 ASSESSMENT — PATIENT HEALTH QUESTIONNAIRE - PHQ9: CLINICAL INTERPRETATION OF PHQ2 SCORE: 0

## 2020-06-05 NOTE — ASSESSMENT & PLAN NOTE
This is a new problem for this patient.  Patient reports that she was fine until since she noticed 1 lesion on the right side and 1 lesion on the floor of her tongue.  Dentist referred her to oral surgeon, she saw oral surgeon and he recommended to follow-up with physician.  She reports that the lesion which is on the floor of the tongue is hard, nontender.  And the lesion on the right side of her cheek and in the front side is whitish in color.  She reports that she does not chew on  her cheeks.

## 2020-06-05 NOTE — ASSESSMENT & PLAN NOTE
This is a new problem for this patient.  She reports that she noticed a lump in her right breast.  She reports that she was playing with her dog talking to purpose and she felt pain in her right breast and when she palpated she felt the lump.  She denies any previous history of breast cancer.  Her last mammogram was normal as per patient.  She does have significant family history of breast cancer in mother, and grandmother, and family history of ovarian cancer in mother and sister.  She denies any breast discharge, nipple changes, skin changes.

## 2020-06-05 NOTE — ASSESSMENT & PLAN NOTE
"This is a chronic problem for this patient. Patient is smoking 1 ppd a day. She has been smoking for 30 years.  She was prescribed Wellbutrin at last visit\" 18.  Patient reports that she never got this prescription from her pharmacy.  She is interested in taking Wellbutrin for smoking cessation.  "

## 2020-06-05 NOTE — ASSESSMENT & PLAN NOTE
This is a chronic problem for this patient.  She reports that she had multiple moles over her back.  Her  noticed that 3 of them were getting bigger in size.  She reports that she could not tell if they are getting bigger in size or change in color because they are on her back.

## 2020-06-05 NOTE — PROGRESS NOTES
"FAMILY MEDICINE VISIT                                                               Chief complaint::Diagnoses of Encounter to establish care, Lump of right breast, Family history of ovarian cancer, Family history of breast cancer, Numerous moles, Tobacco dependence, Primary insomnia, Chronic migraine without aura without status migrainosus, not intractable, Elevated LDL cholesterol level, and Oral lesion were pertinent to this visit.    History of present illness: Savita Russell is a 44 y.o. female who presented to [facility], acute complaint of lump of right breast, oral lesion, insomnia, migraine headaches.    Lump of right breast  This is a new problem for this patient.  She reports that she noticed a lump in her right breast.  She reports that she was playing with her dog talking to purpose and she felt pain in her right breast and when she palpated she felt the lump.  She denies any previous history of breast cancer.  Her last mammogram was normal as per patient.  She does have significant family history of breast cancer in mother, and grandmother, and family history of ovarian cancer in mother and sister.  She denies any breast discharge, nipple changes, skin changes.    Chronic migraine without aura without status migrainosus, not intractable  This is a chronic problem for this patient.  New to me.  She reports that she uses Imitrex and Motrin 2-3 times in a week.  She really gets bad headache.  She has not tried any prophylactic medication before.  She is interested in talking about prophylactic medication.    Tobacco dependence  This is a chronic problem for this patient. Patient is smoking 1 ppd a day. She has been smoking for 30 years.  She was prescribed Wellbutrin at last visit\" 18.  Patient reports that she never got this prescription from her pharmacy.  She is interested in taking Wellbutrin for smoking cessation.    Primary insomnia  This is a chronic problem for this patient.  New to " me.  Patient reports that she has difficulty going to sleep.  She has tried trazodone in the past which did not work for her.  She is using over-the-counter Unisom for her symptoms.  She reports taking this medication daily.     Oral lesion  This is a new problem for this patient.  Patient reports that she was fine until since she noticed 1 lesion on the right side and 1 lesion on the floor of her tongue.  Dentist referred her to oral surgeon, she saw oral surgeon and he recommended to follow-up with physician.  She reports that the lesion which is on the floor of the tongue is hard, nontender.  And the lesion on the right side of her cheek and in the front side is whitish in color.  She reports that she does not chew on  her cheeks.    Numerous moles  This is a chronic problem for this patient.  She reports that she had multiple moles over her back.  Her  noticed that 3 of them were getting bigger in size.  She reports that she could not tell if they are getting bigger in size or change in color because they are on her back.    Review of systems:     Review of Systems   Constitutional: Negative for chills, fever, malaise/fatigue and weight loss.   HENT: Negative for ear discharge, ear pain, hearing loss and sinus pain.         2 lesions, 1 on right cheek and 1 on the floor of the tongue.   Eyes: Negative for pain, discharge and redness.   Respiratory: Negative for cough, hemoptysis, sputum production, shortness of breath and wheezing.    Cardiovascular: Negative for chest pain, palpitations and leg swelling.   Gastrointestinal: Negative for abdominal pain, constipation, diarrhea, nausea and vomiting.   Genitourinary: Negative for dysuria, frequency and urgency.   Musculoskeletal: Negative for joint pain and myalgias.   Skin: Negative for itching and rash.        Multiple moles on the back, few of them are getting bigger in size as per patient's    Neurological: Negative for dizziness, sensory change,  focal weakness and headaches.        Frequent migraine headaches.   Endo/Heme/Allergies: Negative for environmental allergies.   Psychiatric/Behavioral: Negative for depression, substance abuse and suicidal ideas. The patient has insomnia. The patient is not nervous/anxious.    Right breast lump     Past Medical, Surgical and Family History:    History reviewed. No pertinent past medical history.  Past Surgical History:   Procedure Laterality Date   • ABDOMINAL HYSTERECTOMY TOTAL      one ovary left for hormones   • EYE SURGERY     • PRIMARY C SECTION       Family History   Problem Relation Age of Onset   • Other Mother         Cervical CA   • Cancer Mother         Ovarian CA, breast CA   • Cancer Sister 47        Ovarian CA   • Cancer Maternal Grandmother         breast CA        Social History:    Social History     Tobacco Use   • Smoking status: Current Every Day Smoker     Packs/day: 1.00     Types: Cigarettes   • Smokeless tobacco: Never Used   • Tobacco comment: 1 pack per day for 30 years   Substance Use Topics   • Alcohol use: No   • Drug use: No      Social History     Substance and Sexual Activity   Sexual Activity Yes   • Partners: Male   • Birth control/protection: Surgical    Comment: Work for [employer]       Medications and Allergies:     Current Outpatient Medications   Medication Sig Dispense Refill   • buPROPion SR (WELLBUTRIN-SR) 150 MG TABLET SR 12 HR sustained-release tablet Take 1 Tab by mouth 2 times a day. First 3 days take one tablet daily. 60 Tab 5   • amitriptyline (ELAVIL) 10 MG Tab Take 1 Tab by mouth every bedtime. 60 Tab 0   • ibuprofen (MOTRIN) 800 MG Tab TAKE ONE TABLET BY MOUTH EVERY 8 HOURS WITH FOOD AS NEEDED 40 Tab 0   • sumatriptan (IMITREX) 100 MG tablet Take 1 Tab by mouth Once PRN for Migraine for up to 1 dose. 10 Tab 11     No current facility-administered medications for this visit.         Allergies   Allergen Reactions   • Demerol      Flat line   • Morphine      Starts to see  "things    • Trazodone      Hard time breathing, head aches   • Vicodin [Hydrocodone-Acetaminophen]      Hives, rash        Vitals:    BP (!) 98/66 (BP Location: Left arm, Patient Position: Sitting, BP Cuff Size: Adult)   Pulse 85   Temp 37.6 °C (99.6 °F) (Temporal)   Resp 16   Ht 1.664 m (5' 5.5\")   Wt 53.4 kg (117 lb 11.6 oz)   SpO2 96%  Body mass index is 19.29 kg/m².    Physical Exam:     Physical Exam   Constitutional: She is well-developed, well-nourished, and in no distress. No distress.   HENT:   Head: Normocephalic and atraumatic.   Right Ear: External ear normal.   Left Ear: External ear normal.   Nose: Nose normal.   Mouth/Throat: Oropharynx is clear and moist. No oropharyngeal exudate.   Very small whitish linear lesion over right side of her cheek on the front side.    Raised hard oval mass on floor of tongue on left side.   Eyes: Conjunctivae and EOM are normal. Right eye exhibits no discharge. Left eye exhibits no discharge.   Neck: Neck supple. No thyromegaly present.   Cardiovascular: Normal rate, regular rhythm, normal heart sounds and intact distal pulses.   No murmur heard.  Pulmonary/Chest: Effort normal and breath sounds normal. No respiratory distress. She has no wheezes. She has no rales.   Abdominal: Soft. Bowel sounds are normal. She exhibits no distension. There is no abdominal tenderness. There is no guarding.   Musculoskeletal:         General: No tenderness, deformity or edema.   Neurological: She is alert. She exhibits normal muscle tone. Gait normal.   Skin: Skin is warm. No rash noted. She is not diaphoretic.   Multiple moles at her upper back.  3 of the moles are bigger in size.   Psychiatric: Mood, affect and judgment normal.      Right breast: Tender lump around 1 cm in diameter palpated in  inner quadrant of right breast.  No nipple discharge, no skin changes, no axillary lymphadenopathy.  No mass palpated in left breast.    (Medical assistant: Amanda was present during " examination.)    Assessment/Plan:    Savita was seen today for establish care.    Diagnoses and all orders for this visit:    Encounter to establish care    Lump of right breast:  · Check mammogram diagnostic of palpated abnormality.    -     MA DIAGNOSTIC MAMMO BILAT W/CAD; Future    Family history of ovarian cancer  Family history of breast cancer:    · Discussed with patient about getting genetic testing.  Patient reports that previously she was told that testing is not covered by her insurance.  I discussed with patient about [facility], given pamphlet to her to talk with them about getting genetic testing.  She will also talk to her insurance about this and let me know at next visit.    Numerous moles:  · Referral given to dermatology for suspicious 3 moles which are getting bigger in size.    -     REFERRAL TO DERMATOLOGY    Tobacco dependence:  · Counseled patient regarding smoking.  Prescribed Wellbutrin to help with smoking cessation.    -     buPROPion SR (WELLBUTRIN-SR) 150 MG TABLET SR 12 HR sustained-release tablet; Take 1 Tab by mouth 2 times a day. First 3 days take one tablet daily.    Primary insomnia:  · Discussed with patient about sleep hygiene.  We will try amitriptyline which will help with her migraine and insomnia.  If sleep not better, will refer to sleep medicine for further evaluation.  · Check blood work CBC, CMP, lipid panel and thyroid function test.    -     CBC WITHOUT DIFFERENTIAL; Future  -     TSH; Future  -     TRIIDOTHYRONINE; Future  -     FREE THYROXINE; Future    Chronic migraine without aura without status migrainosus, not intractable:  · Uncontrolled with abortive therapy.  · Discussed with patient about prophylactic therapies like beta-blocker, calcium channel blocker, anticonvulsant and TCA.  · We will try amitriptyline 10 mg at bedtime.  Advised patient to contact us if she has any side effects.    -     amitriptyline (ELAVIL) 10 MG Tab; Take 1 Tab by mouth  every bedtime.    Elevated LDL cholesterol level:  · Last lipid panel showed LDL.  Recheck lipid panel.  Advised healthy diet.    -     Comp Metabolic Panel; Future  -     Lipid Profile; Future    Oral lesion:  · 1 lesion over her right side of her cheek appears biting.  The lesion over the floor of the tongue appears like mucocele.  Discussed with patient that mucocele are benign in nature, refered patient to ENT for removal of that mucocele.    -     REFERRAL TO ENT         Please note that this dictation was created using voice recognition software. I have made every reasonable attempt to correct obvious errors, but I expect that there are errors of grammar and possibly content that I did not discover before finalizing the note.    Follow up in 6 weeks for lab follow-up, follow-up for migraine.

## 2020-06-05 NOTE — ASSESSMENT & PLAN NOTE
This is a chronic problem for this patient.  New to me.  She reports that she uses Imitrex and Motrin 2-3 times in a week.  She really gets bad headache.  She has not tried any prophylactic medication before.  She is interested in talking about prophylactic medication.

## 2020-06-05 NOTE — ASSESSMENT & PLAN NOTE
This is a chronic problem for this patient.  New to me.  Patient reports that she has difficulty going to sleep.  She has tried trazodone in the past which did not work for her.  She is using over-the-counter Unisom for her symptoms.  She reports taking this medication daily.

## 2020-07-21 ENCOUNTER — APPOINTMENT (OUTPATIENT)
Dept: MEDICAL GROUP | Facility: MEDICAL CENTER | Age: 45
End: 2020-07-21
Payer: COMMERCIAL

## 2020-09-21 DIAGNOSIS — G43.709 CHRONIC MIGRAINE WITHOUT AURA WITHOUT STATUS MIGRAINOSUS, NOT INTRACTABLE: ICD-10-CM

## 2020-09-21 RX ORDER — IBUPROFEN 800 MG/1
800 TABLET ORAL EVERY 8 HOURS PRN
Qty: 40 TAB | Refills: 0 | Status: SHIPPED | OUTPATIENT
Start: 2020-09-21 | End: 2021-01-28 | Stop reason: SDUPTHER

## 2020-09-21 NOTE — TELEPHONE ENCOUNTER
Received request via: Patient    Was the patient seen in the last year in this department? Yes    Does the patient have an active prescription (recently filled or refills available) for medication(s) requested? Takes as needed.

## 2020-09-21 NOTE — TELEPHONE ENCOUNTER
I sent Motrin prescription for this patient.  Her last blood work was in May 2018.  Please advise patient to do blood work as ibuprofen can affect her kidneys.  Thank you.

## 2020-09-22 NOTE — TELEPHONE ENCOUNTER
Called patient and left message, informing her that Dr. Villela had refilled her prescription, and wanted her to complete the blood work that was already ordered in the [facility] network.

## 2020-12-07 ENCOUNTER — PATIENT MESSAGE (OUTPATIENT)
Dept: MEDICAL GROUP | Facility: MEDICAL CENTER | Age: 45
End: 2020-12-07

## 2020-12-10 ENCOUNTER — PATIENT MESSAGE (OUTPATIENT)
Dept: MEDICAL GROUP | Facility: MEDICAL CENTER | Age: 45
End: 2020-12-10

## 2020-12-10 DIAGNOSIS — Z01.84 IMMUNITY STATUS TESTING: ICD-10-CM

## 2020-12-11 ENCOUNTER — HOSPITAL ENCOUNTER (OUTPATIENT)
Dept: LAB | Facility: MEDICAL CENTER | Age: 45
End: 2020-12-11
Attending: FAMILY MEDICINE
Payer: COMMERCIAL

## 2020-12-11 DIAGNOSIS — Z01.84 IMMUNITY STATUS TESTING: ICD-10-CM

## 2020-12-11 LAB — HBV SURFACE AB SERPL IA-ACNC: <3.5 MIU/ML (ref 0–10)

## 2020-12-11 PROCEDURE — 86762 RUBELLA ANTIBODY: CPT

## 2020-12-11 PROCEDURE — 86706 HEP B SURFACE ANTIBODY: CPT

## 2020-12-11 PROCEDURE — 86708 HEPATITIS A ANTIBODY: CPT

## 2020-12-11 PROCEDURE — 86735 MUMPS ANTIBODY: CPT

## 2020-12-11 PROCEDURE — 36415 COLL VENOUS BLD VENIPUNCTURE: CPT

## 2020-12-11 PROCEDURE — 86765 RUBEOLA ANTIBODY: CPT

## 2020-12-14 ENCOUNTER — PATIENT MESSAGE (OUTPATIENT)
Dept: MEDICAL GROUP | Facility: MEDICAL CENTER | Age: 45
End: 2020-12-14

## 2020-12-14 DIAGNOSIS — Z01.84 IMMUNITY STATUS TESTING: ICD-10-CM

## 2020-12-14 LAB
HAV AB SER QL IA: NEGATIVE
MEV IGG SER IA-ACNC: 1.48
MUV IGG SER IA-ACNC: 0.9
RUBV AB SER QL: 28.8 IU/ML

## 2020-12-14 NOTE — RESULT ENCOUNTER NOTE
Recent blood work regarding immunity testing for MMR, hepatitis B results released to [facility].  Patient is immune to MMR but not to hepatitis B. Will need 3 dose series at 0-month, 1 to 2 months and greater than 6 months.  [facility] message sent to patient to schedule medical assistant visit to get this vaccine series.

## 2020-12-15 NOTE — RESULT ENCOUNTER NOTE
Results released to [facility].  Hepatitis A antibody came back negative, requires 2 doses 6 months apart.  Advised to schedule medical assistant visit to get this vaccine.

## 2020-12-29 ENCOUNTER — HOSPITAL ENCOUNTER (OUTPATIENT)
Dept: LAB | Facility: MEDICAL CENTER | Age: 45
End: 2020-12-29
Attending: FAMILY MEDICINE
Payer: COMMERCIAL

## 2020-12-29 DIAGNOSIS — Z01.84 IMMUNITY STATUS TESTING: ICD-10-CM

## 2020-12-29 PROCEDURE — 36415 COLL VENOUS BLD VENIPUNCTURE: CPT

## 2020-12-29 PROCEDURE — 86787 VARICELLA-ZOSTER ANTIBODY: CPT

## 2020-12-31 LAB
VZV IGG SER IA-ACNC: 2033 IV
VZV IGM SER IA-ACNC: 0.08 ISR

## 2021-01-04 ENCOUNTER — PATIENT MESSAGE (OUTPATIENT)
Dept: MEDICAL GROUP | Facility: MEDICAL CENTER | Age: 46
End: 2021-01-04

## 2021-01-04 DIAGNOSIS — Z11.1 SCREENING FOR TUBERCULOSIS: ICD-10-CM

## 2021-01-20 ENCOUNTER — HOSPITAL ENCOUNTER (OUTPATIENT)
Dept: LAB | Facility: MEDICAL CENTER | Age: 46
End: 2021-01-20
Attending: FAMILY MEDICINE
Payer: COMMERCIAL

## 2021-01-20 DIAGNOSIS — Z11.1 SCREENING FOR TUBERCULOSIS: ICD-10-CM

## 2021-01-20 PROCEDURE — 36415 COLL VENOUS BLD VENIPUNCTURE: CPT

## 2021-01-20 PROCEDURE — 86480 TB TEST CELL IMMUN MEASURE: CPT

## 2021-01-22 LAB
GAMMA INTERFERON BACKGROUND BLD IA-ACNC: 0.04 IU/ML
M TB IFN-G BLD-IMP: NEGATIVE
M TB IFN-G CD4+ BCKGRND COR BLD-ACNC: -0.01 IU/ML
MITOGEN IGNF BCKGRD COR BLD-ACNC: >10 IU/ML
QFT TB2 - NIL TBQ2: 0 IU/ML

## 2021-01-27 ENCOUNTER — PATIENT MESSAGE (OUTPATIENT)
Dept: MEDICAL GROUP | Facility: MEDICAL CENTER | Age: 46
End: 2021-01-27

## 2021-01-27 DIAGNOSIS — G43.709 CHRONIC MIGRAINE WITHOUT AURA WITHOUT STATUS MIGRAINOSUS, NOT INTRACTABLE: ICD-10-CM

## 2021-01-28 RX ORDER — IBUPROFEN 800 MG/1
800 TABLET ORAL EVERY 8 HOURS PRN
Qty: 40 TAB | Refills: 1 | Status: SHIPPED | OUTPATIENT
Start: 2021-01-28 | End: 2022-03-30

## 2021-05-05 ENCOUNTER — OFFICE VISIT (OUTPATIENT)
Dept: URGENT CARE | Facility: CLINIC | Age: 46
End: 2021-05-05
Payer: COMMERCIAL

## 2021-05-05 ENCOUNTER — NURSE TRIAGE (OUTPATIENT)
Dept: HEALTH INFORMATION MANAGEMENT | Facility: OTHER | Age: 46
End: 2021-05-05

## 2021-05-05 VITALS
HEART RATE: 77 BPM | RESPIRATION RATE: 12 BRPM | HEIGHT: 65 IN | WEIGHT: 109 LBS | SYSTOLIC BLOOD PRESSURE: 108 MMHG | DIASTOLIC BLOOD PRESSURE: 64 MMHG | TEMPERATURE: 99.2 F | OXYGEN SATURATION: 95 % | BODY MASS INDEX: 18.16 KG/M2

## 2021-05-05 DIAGNOSIS — H93.12 TINNITUS OF LEFT EAR: ICD-10-CM

## 2021-05-05 DIAGNOSIS — H91.92 DECREASED HEARING OF LEFT EAR: ICD-10-CM

## 2021-05-05 DIAGNOSIS — H92.02 OTALGIA OF LEFT EAR: ICD-10-CM

## 2021-05-05 PROCEDURE — 99214 OFFICE O/P EST MOD 30 MIN: CPT | Performed by: NURSE PRACTITIONER

## 2021-05-05 RX ORDER — AMOXICILLIN AND CLAVULANATE POTASSIUM 875; 125 MG/1; MG/1
1 TABLET, FILM COATED ORAL 2 TIMES DAILY
Qty: 20 TABLET | Refills: 0 | Status: SHIPPED | OUTPATIENT
Start: 2021-05-05 | End: 2021-05-15

## 2021-05-05 ASSESSMENT — LIFESTYLE VARIABLES: SUBSTANCE_ABUSE: 0

## 2021-05-05 ASSESSMENT — ENCOUNTER SYMPTOMS
FEVER: 0
SORE THROAT: 0
NAUSEA: 0
CHILLS: 0
HEADACHES: 0

## 2021-05-05 NOTE — TELEPHONE ENCOUNTER
Regarding: Left ear   ----- Message from Ashlee Santos sent at 5/5/2021  7:23 AM PDT -----  Left ear tender inside. No other symptoms.

## 2021-05-05 NOTE — TELEPHONE ENCOUNTER
Left ear tenderness and says she cannot hear.  All she hears is throbbing. Sx began 3 days ago.    1. Caller Name: Savita Russell  2.                  Call Back Number: [phone] (home)   3.   [facility] PCP or Specialty Provider: Yes Heath Villela M.D.        2. Has the patient previously tested positive for COVID-19? No    3.  In the last two weeks, has the patient had any new or worsening symptoms (not explained by alternative diagnosis)? No.    4.  Does patient have any comoribidities? None     5.  Has the patient had any known contact with someone who is suspected or confirmed to have COVID-19? No.    5. Disposition: Cleared by RN Triage as potential is low for COVID-19; OK to keep/schedule appointment    Note routed to [facility] Provider: [facility] only.     Reason for Disposition  • Earache  (Exceptions: brief ear pain of < 60 minutes duration, earache occurring during air travel    Additional Information  • Negative: Moving the earlobe or touching the ear clearly increases the pain  • Negative: Foreign body struck in the ear (e.g., bug, piece of cotton)  • Negative: Followed an ear injury  • Negative: [1] Recently diagnosed with otitis media AND [2] currently on oral antibiotics  • Negative: [1] Stiff neck (unable to touch chin to chest) AND [2] fever  • Negative: [1] Bony area of skull behind the ear is pink or swollen AND [2] fever  • Negative: Fever > 104 F (40 C)  • Negative: Patient sounds very sick or weak to the triager  • Negative: [1] SEVERE pain AND [2] not improved 2 hours after taking analgesic medication (e.g., ibuprofen or acetaminophen)  • Negative: Walking is very unsteady  • Negative: Sudden onset of ear pain after long - thin object was inserted into the ear canal (e.g., pencil, Q-tip)  • Negative: Diabetes mellitus or weak immune system (e.g., HIV positive, cancer chemo, splenectomy, organ transplant, chronic steroids)  • Negative: New blurred vision or vision changes  • Negative:  "White, yellow, or green discharge  • Negative: Bloody discharge or unexplained bleeding from ear canal    Answer Assessment - Initial Assessment Questions  1. LOCATION: \"Which ear is involved?\"      Left ear  2. ONSET: \"When did the ear start hurting\"       3 days ago  3. SEVERITY: \"How bad is the pain?\"  (Scale 1-10; mild, moderate or severe)    - MILD (1-3): doesn't interfere with normal activities     - MODERATE (4-7): interferes with normal activities or awakens from sleep     - SEVERE (8-10): excruciating pain, unable to do any normal activities       7/10  4. URI SYMPTOMS: \" Do you have a runny nose or cough?\"      no  5. FEVER: \"Do you have a fever?\" If so, ask: \"What is your temperature, how was it measured, and when did it start?\"      no  6. CAUSE: \"Have you been swimming recently?\", \"How often do you use Q-TIPS?\", \"Have you had any recent air travel or scuba diving?\"      no  7. OTHER SYMPTOMS: \"Do you have any other symptoms?\" (e.g., headache, stiff neck, dizziness, vomiting, runny nose, decreased hearing)      no  8. PREGNANCY: \"Is there any chance you are pregnant?\" \"When was your last menstrual period?\"     no    Protocols used: EARACHE-A-AH      "

## 2021-05-05 NOTE — PROGRESS NOTES
"Savita Russell is a 45 y.o. female who presents for Otalgia (/fullness & pressure LEFT ear x4 days )      HPI This is a new problem.  Savita is a 44 y/o female with c/o Left ear pain \" deep inside' . Initially loss of hearing \" muffled\" sensation with buzzing noise. Overall this is making her not feeling well. Treatment tried:antihistamine without improvement. Pain is sharp and severe.   No other aggravating or alleviating factors.       Review of Systems   Constitutional: Negative for chills, fever and malaise/fatigue.   HENT: Negative for congestion and sore throat.    Gastrointestinal: Negative for nausea.   Neurological: Negative for headaches.   Psychiatric/Behavioral: Negative for substance abuse.       Allergies   Allergen Reactions   • Demerol      Flat line   • Morphine      Starts to see things    • Trazodone      Hard time breathing, head aches   • Vicodin [Hydrocodone-Acetaminophen]      Hives, rash      History reviewed. No pertinent past medical history.  Past Surgical History:   Procedure Laterality Date   • ABDOMINAL HYSTERECTOMY TOTAL      one ovary left for hormones   • EYE SURGERY     • PRIMARY C SECTION       Family History   Problem Relation Age of Onset   • Other Mother         Cervical CA   • Cancer Mother         Ovarian CA, breast CA   • Cancer Sister 47        Ovarian CA   • Cancer Maternal Grandmother         breast CA     Social History     Tobacco Use   • Smoking status: Current Every Day Smoker     Packs/day: 1.00     Types: Cigarettes   • Smokeless tobacco: Never Used   • Tobacco comment: 1 pack per day for 30 years   Substance Use Topics   • Alcohol use: No     Patient Active Problem List   Diagnosis   • Chronic migraine without aura without status migrainosus, not intractable   • Irritable bowel syndrome with diarrhea   • Family history of ovarian cancer   • Family history of breast cancer   • Primary insomnia   • Tobacco dependence   • Lump of right breast   • Elevated " "LDL cholesterol level   • Oral lesion   • Numerous moles     Current Outpatient Medications on File Prior to Visit   Medication Sig Dispense Refill   • ibuprofen (MOTRIN) 800 MG Tab Take 1 Tab by mouth every 8 hours as needed for Moderate Pain or Headache. 40 Tab 1   • sumatriptan (IMITREX) 100 MG tablet Take 1 Tab by mouth Once PRN for Migraine for up to 1 dose. 10 Tab 11     No current facility-administered medications on file prior to visit.          Objective:     /64   Pulse 77   Temp 37.3 °C (99.2 °F) (Temporal)   Resp 12   Ht 1.651 m (5' 5\")   Wt 49.4 kg (109 lb)   SpO2 95%   Breastfeeding No   BMI 18.14 kg/m²     Physical Exam  Vitals and nursing note reviewed.   Constitutional:       Appearance: She is well-developed.   HENT:      Head: Normocephalic.        Right Ear: Hearing normal.      Left Ear: Decreased hearing noted. Tympanic membrane is erythematous (mild visable erythema. TM is not fully visualilzed due to small , tortuous canal. ).   Eyes:      Conjunctiva/sclera: Conjunctivae normal.   Cardiovascular:      Rate and Rhythm: Normal rate and regular rhythm.      Pulses: Normal pulses.      Heart sounds: Normal heart sounds.   Pulmonary:      Effort: Pulmonary effort is normal. No respiratory distress.      Breath sounds: Normal breath sounds.   Musculoskeletal:         General: Normal range of motion.      Cervical back: Normal range of motion and neck supple.   Lymphadenopathy:      Head:      Right side of head: No tonsillar adenopathy.      Left side of head: No tonsillar adenopathy.      Cervical: No cervical adenopathy.      Upper Body:      Right upper body: No supraclavicular adenopathy.      Left upper body: No supraclavicular adenopathy.   Skin:     General: Skin is warm and dry.      Capillary Refill: Capillary refill takes less than 2 seconds.   Neurological:      Mental Status: She is alert and oriented to person, place, and time.      Deep Tendon Reflexes: Reflexes are " normal and symmetric.   Psychiatric:         Mood and Affect: Mood normal.         Speech: Speech normal.         Behavior: Behavior normal.         Thought Content: Thought content normal.         Assessment /Associated Orders:      1. Otalgia of left ear  amoxicillin-clavulanate (AUGMENTIN) 875-125 MG Tab   2. Decreased hearing of left ear  amoxicillin-clavulanate (AUGMENTIN) 875-125 MG Tab   3. Tinnitus of left ear           Medical Decision Making:    Pt is clinically stable at today's acute urgent care visit.  No acute distress noted. Appropriate for outpatient management at this time.   Educated in proper administration of medication(s) ordered today including safety, possible SE, risks, benefits, rationale and alternatives to therapy.   OTC  analgesic of choice (acetaminophen or NSAID). Follow manufactures dosing and safety precautions.   Warm compresses prn pain.   Differential Diagnosis includes but is not limited to: early mastoiditis, AOM - unable to visualize entire TM due to anatomical tight canal, sinusitiis, tinnitus or other. Red Flags dw pt. Verb understanding.     Advised to follow-up with the primary care provider for recheck, reevaluation, and consideration of further management if necessary.   Discussed management options (risks,benefits, and alternatives to treatment). Expressed understanding and the treatment plan was agreed upon. Questions were encouraged and answered       Return to urgent care prn if new or worsening sx or if there is no improvement in condition prn . Red flags discussed and indications to immediately call 911 or present to the Emergency Department.     I personally reviewed prior external notes and test results pertinent to today's visit.  I have independently reviewed and interpreted all diagnostics ordered during this urgent care acute visit.   Time spent evaluating this patient was at least 30 minutes and includes preparing for visit, counseling/education, exam and  evaluation, obtaining history, independent interpretation, ordering lab/test/procedures,medication management and documentation.

## 2022-03-30 ENCOUNTER — OFFICE VISIT (OUTPATIENT)
Dept: MEDICAL GROUP | Facility: MEDICAL CENTER | Age: 47
End: 2022-03-30
Payer: COMMERCIAL

## 2022-03-30 ENCOUNTER — HOSPITAL ENCOUNTER (OUTPATIENT)
Dept: RADIOLOGY | Facility: MEDICAL CENTER | Age: 47
End: 2022-03-30
Attending: FAMILY MEDICINE
Payer: COMMERCIAL

## 2022-03-30 VITALS
HEIGHT: 65 IN | SYSTOLIC BLOOD PRESSURE: 120 MMHG | OXYGEN SATURATION: 99 % | WEIGHT: 116.84 LBS | RESPIRATION RATE: 16 BRPM | TEMPERATURE: 98.5 F | DIASTOLIC BLOOD PRESSURE: 62 MMHG | HEART RATE: 81 BPM | BODY MASS INDEX: 19.47 KG/M2

## 2022-03-30 DIAGNOSIS — R20.2 NUMBNESS AND TINGLING OF UPPER EXTREMITY: ICD-10-CM

## 2022-03-30 DIAGNOSIS — M25.512 ACUTE PAIN OF LEFT SHOULDER: ICD-10-CM

## 2022-03-30 DIAGNOSIS — R20.0 NUMBNESS AND TINGLING OF UPPER EXTREMITY: ICD-10-CM

## 2022-03-30 DIAGNOSIS — Z91.89 AT RISK FOR BREAST CANCER: ICD-10-CM

## 2022-03-30 DIAGNOSIS — D22.9 NUMEROUS MOLES: ICD-10-CM

## 2022-03-30 DIAGNOSIS — Z12.31 ENCOUNTER FOR SCREENING MAMMOGRAM FOR BREAST CANCER: ICD-10-CM

## 2022-03-30 PROCEDURE — 99214 OFFICE O/P EST MOD 30 MIN: CPT | Performed by: FAMILY MEDICINE

## 2022-03-30 PROCEDURE — 73030 X-RAY EXAM OF SHOULDER: CPT | Mod: LT

## 2022-03-30 PROCEDURE — 72040 X-RAY EXAM NECK SPINE 2-3 VW: CPT

## 2022-03-30 RX ORDER — GABAPENTIN 300 MG/1
300 CAPSULE ORAL
Qty: 60 CAPSULE | Refills: 1 | Status: SHIPPED | OUTPATIENT
Start: 2022-03-30 | End: 2024-03-01

## 2022-03-30 RX ORDER — METHYLPREDNISOLONE 4 MG/1
TABLET ORAL
Qty: 21 TABLET | Refills: 0 | Status: SHIPPED | OUTPATIENT
Start: 2022-03-30 | End: 2022-08-12 | Stop reason: SDUPTHER

## 2022-03-30 RX ORDER — IBUPROFEN 800 MG/1
800 TABLET ORAL EVERY 8 HOURS PRN
Qty: 60 TABLET | Refills: 1 | Status: SHIPPED | OUTPATIENT
Start: 2022-03-30 | End: 2022-08-12

## 2022-03-30 ASSESSMENT — ENCOUNTER SYMPTOMS
PALPITATIONS: 0
SHORTNESS OF BREATH: 0
FEVER: 0
CHILLS: 0

## 2022-03-30 ASSESSMENT — PATIENT HEALTH QUESTIONNAIRE - PHQ9: CLINICAL INTERPRETATION OF PHQ2 SCORE: 0

## 2022-03-30 NOTE — PROGRESS NOTES
FAMILY MEDICINE VISIT                                                               Chief complaint::Diagnoses of Acute pain of left shoulder, Numbness and tingling of upper extremity, Encounter for screening mammogram for breast cancer, At risk for breast cancer, and Numerous moles were pertinent to this visit.    History of present illness: Savita Russell is a 46 y.o. female who presented for shoulder pain, numbness and tingling in both upper arms.  She is here with her  today.    She reports that she has been having numbness and tingling in both upper arms since 3 months and started having left shoulder pain for last 2 weeks.  She lift heavy weight around 200 to 250 pounds at work.  Reports that they have been short staffed at work and has been doing more shifts there.  Denies any direct trauma to her neck and shoulder.  Has numbness and tingling in both arms as well as her hands.  No previous history of neck problems.  Has been trying ibuprofen, tried over-the-counter cream, massage, has not been helping with these symptoms.    Reports that has 3 moles at her back which are getting bigger in size. would like further evaluation for that.      Review of systems:     Review of Systems   Constitutional: Negative for chills, fever and malaise/fatigue.   Respiratory: Negative for shortness of breath.    Cardiovascular: Negative for chest pain, palpitations and leg swelling.   Musculoskeletal: Positive for joint pain.        Left shoulder pain   Neurological:        Numbness and tingling of both arms, hands.        Medications and Allergies:     Current Outpatient Medications   Medication Sig Dispense Refill   • ibuprofen (MOTRIN) 800 MG Tab Take 1 Tablet by mouth every 8 hours as needed for Moderate Pain or Headache. 60 Tablet 1   • methylPREDNISolone (MEDROL DOSEPAK) 4 MG Tablet Therapy Pack As directed on the packaging label. 21 Tablet 0   • gabapentin (NEURONTIN) 300 MG Cap Take 1 Capsule by mouth  "at bedtime. 60 Capsule 1   • sumatriptan (IMITREX) 100 MG tablet Take 1 Tab by mouth Once PRN for Migraine for up to 1 dose. 10 Tab 11     No current facility-administered medications for this visit.          Vitals:    /62 (BP Location: Left arm, Patient Position: Sitting, BP Cuff Size: Adult)   Pulse 81   Temp 36.9 °C (98.5 °F)   Resp 16   Ht 1.651 m (5' 5\")   Wt 53 kg (116 lb 13.5 oz)   SpO2 99%  Body mass index is 19.44 kg/m².    Physical Exam:     Physical Exam  Constitutional:       General: She is not in acute distress.  HENT:      Head: Normocephalic and atraumatic.   Eyes:      Conjunctiva/sclera: Conjunctivae normal.   Cardiovascular:      Rate and Rhythm: Normal rate.   Pulmonary:      Effort: Pulmonary effort is normal. No respiratory distress.   Musculoskeletal:         General: No deformity.      Cervical back: Neck supple.      Comments: Left shoulder  General: no gross deformity  ROM: flex 180, extension 50, ER 60, IR equal  Scapula: - winging, - dyskinesia  Palpation: TTP at anterior part of shoulder and at clavicular region.  Significant pain on extension of left arm  Strength: 5/5 abduction, 5/5 ER, 5/5 IR, 5/5 subscapular lift  Rotator Cuff: Empty can -, External rotation lag -  Impingement: Neer’s -, Irvin -  Neuro: Sensation equal and intact bilaterally    Neck: No tenderness on palpation at neck region, full range of motion     Skin:     Comments: 3 moles at upper back which are bigger in size   Neurological:      Mental Status: She is alert.      Gait: Gait is intact.   Psychiatric:         Mood and Affect: Mood and affect normal.         Judgment: Judgment normal.              Assessment/Plan:    1. Acute pain of left shoulder  New health problem, symptoms consistent with tendinitis, start Medrol Dosepak.  Prescribed ibuprofen to be taken as needed.  Recommended not to take ibuprofen and Medrol Dosepak together as that can cause stomach upset and increased risk of bleeding.  " Check left shoulder x-ray and referral to physical therapy.  - ibuprofen (MOTRIN) 800 MG Tab; Take 1 Tablet by mouth every 8 hours as needed for Moderate Pain or Headache.  Dispense: 60 Tablet; Refill: 1  - DX-SHOULDER 2+ LEFT; Future  - Referral to Physical Therapy    2. Numbness and tingling of upper extremity  New health problem, symptoms consistent with nerve compression, check x-ray, referral to physical therapy.  Start gabapentin at bedtime.  Discussed with patient about not lifting heavy weight if possible at work as that can aggravate this condition.  Patient reports understanding.    - methylPREDNISolone (MEDROL DOSEPAK) 4 MG Tablet Therapy Pack; As directed on the packaging label.  Dispense: 21 Tablet; Refill: 0  - gabapentin (NEURONTIN) 300 MG Cap; Take 1 Capsule by mouth at bedtime.  Dispense: 60 Capsule; Refill: 1  - DX-CERVICAL SPINE-2 OR 3 VIEWS; Future  - Referral to Physical Therapy    3. Encounter for screening mammogram for breast cancer  - MA-SCREENING MAMMO BILAT W/TOMOSYNTHESIS W/CAD; Future    4. At risk for breast cancer  , History of cervical, ovarian and breast cancer.  Referral to genetic research studies.    - Referral to Genetic Research Studies    5. Numerous moles  Referral to dermatology for evaluation for 3 bigger moles.    - Referral to Dermatology    Please note that this dictation was created using voice recognition software. I have made every reasonable attempt to correct obvious errors, but I expect that there are errors of grammar and possibly content that I did not discover before finalizing the note.    Follow up in 2 months for follow-up.

## 2022-05-18 ENCOUNTER — OFFICE VISIT (OUTPATIENT)
Dept: DERMATOLOGY | Facility: IMAGING CENTER | Age: 47
End: 2022-05-18
Payer: COMMERCIAL

## 2022-05-18 VITALS — TEMPERATURE: 96.6 F

## 2022-05-18 DIAGNOSIS — L82.1 SK (SEBORRHEIC KERATOSIS): ICD-10-CM

## 2022-05-18 PROCEDURE — 99212 OFFICE O/P EST SF 10 MIN: CPT | Performed by: NURSE PRACTITIONER

## 2022-05-18 NOTE — PROGRESS NOTES
DERMATOLOGY NOTE  NEW VISIT       Chief complaint: Skin Lesion       HPI:  Skin lesion   Location:  Upper back   Time present:  unknown   Painful lesion: No  Itching lesion: No  Enlarging lesion: Yes  Anything make it better or worse?no         History of skin cancer: No  History of precancers/actinic keratoses: No  History of biopsies:No  History of blistering/severe sunburns:No  Family history of skin cancer:Yes, Details: mother melanoma   Family history of atypical moles:No      Allergies   Allergen Reactions   • Demerol      Flat line   • Morphine      Starts to see things    • Trazodone      Hard time breathing, head aches   • Vicodin [Hydrocodone-Acetaminophen]      Hives, rash         MEDICATIONS:  Medications relevant to specialty reviewed.     REVIEW OF SYSTEMS:   Positive for skin (see HPI)  Negative for fevers and chills       EXAM:  There were no vitals taken for this visit.  Constitutional: Well-developed, well-nourished, and in no distress.     A focused skin exam was performed including the affected areas of the back. Notable findings on exam today listed below and/or in assessment/plan.     few medium and dark brown stuck-on waxy papules scattered on the trunk/back      IMPRESSION / PLAN:    1. SK (seborrheic keratosis)  - Benign-appearing nature of lesions discussed during exam.   - Reassurance provided, not bothering pt at this time  - Follow up as needed  - Advised to continue to monitor for any return to clinic for new or concerning changes.            Please note that this dictation was created using voice recognition software. I have made every reasonable attempt to correct obvious errors, but I expect that there are errors of grammar and possibly content that I did not discover before finalizing the note.      Return to clinic in: No follow-ups on file. and for any new or changing skin lesions.

## 2022-06-07 ENCOUNTER — APPOINTMENT (OUTPATIENT)
Dept: MEDICAL GROUP | Facility: MEDICAL CENTER | Age: 47
End: 2022-06-07
Payer: COMMERCIAL

## 2022-08-12 ENCOUNTER — OFFICE VISIT (OUTPATIENT)
Dept: MEDICAL GROUP | Facility: MEDICAL CENTER | Age: 47
End: 2022-08-12
Payer: COMMERCIAL

## 2022-08-12 ENCOUNTER — HOSPITAL ENCOUNTER (OUTPATIENT)
Dept: RADIOLOGY | Facility: MEDICAL CENTER | Age: 47
End: 2022-08-12
Attending: FAMILY MEDICINE
Payer: COMMERCIAL

## 2022-08-12 VITALS
WEIGHT: 119.05 LBS | BODY MASS INDEX: 19.83 KG/M2 | HEART RATE: 74 BPM | TEMPERATURE: 98.4 F | OXYGEN SATURATION: 98 % | HEIGHT: 65 IN | DIASTOLIC BLOOD PRESSURE: 60 MMHG | SYSTOLIC BLOOD PRESSURE: 100 MMHG | RESPIRATION RATE: 16 BRPM

## 2022-08-12 DIAGNOSIS — M25.531 RIGHT WRIST PAIN: ICD-10-CM

## 2022-08-12 PROCEDURE — 99214 OFFICE O/P EST MOD 30 MIN: CPT | Performed by: FAMILY MEDICINE

## 2022-08-12 PROCEDURE — 73110 X-RAY EXAM OF WRIST: CPT | Mod: RT

## 2022-08-12 RX ORDER — METHYLPREDNISOLONE 4 MG/1
TABLET ORAL
Qty: 21 TABLET | Refills: 0 | Status: SHIPPED | OUTPATIENT
Start: 2022-08-12 | End: 2024-03-01

## 2022-08-12 RX ORDER — MELOXICAM 15 MG/1
15 TABLET ORAL
Qty: 60 TABLET | Refills: 1 | Status: SHIPPED | OUTPATIENT
Start: 2022-08-12 | End: 2023-02-02 | Stop reason: SDUPTHER

## 2022-08-12 ASSESSMENT — ENCOUNTER SYMPTOMS
FEVER: 0
CHILLS: 0

## 2022-08-12 NOTE — PROGRESS NOTES
"FAMILY MEDICINE VISIT                                                               Chief complaint::The encounter diagnosis was Right wrist pain.    History of present illness: Savita Russell is a 46 y.o. female who presented for right wrist pain.    She is here for right wrist pain which has been going on since last 4 weeks.  Pain is constant and is on dorsal aspect of right wrist.  She denies any trauma to her wrist.  Pain is more worse when she moves her right third finger.  There is no redness noted there is no swelling.  No numbness and tingling in hand.  She is not picking any heavy weight at work.  She has been taking ibuprofen which has not been much helping with pain.  She is using wrist brace to stabilize.      Review of systems:     Review of Systems   Constitutional:  Negative for chills and fever.   Musculoskeletal:         Right wrist pain      Medications and Allergies:     Current Outpatient Medications   Medication Sig Dispense Refill    methylPREDNISolone (MEDROL DOSEPAK) 4 MG Tablet Therapy Pack As directed on the packaging label. 21 Tablet 0    meloxicam (MOBIC) 15 MG tablet Take 1 Tablet by mouth 1 time a day as needed for Severe Pain, Inflammation or Moderate Pain. 60 Tablet 1    gabapentin (NEURONTIN) 300 MG Cap Take 1 Capsule by mouth at bedtime. 60 Capsule 1    sumatriptan (IMITREX) 100 MG tablet Take 1 Tab by mouth Once PRN for Migraine for up to 1 dose. 10 Tab 11     No current facility-administered medications for this visit.          Vitals:    /60 (BP Location: Left arm, Patient Position: Sitting, BP Cuff Size: Adult)   Pulse 74   Temp 36.9 °C (98.4 °F)   Resp 16   Ht 1.651 m (5' 5\")   Wt 54 kg (119 lb 0.8 oz)   SpO2 98%  Body mass index is 19.81 kg/m².    Physical Exam:     Physical Exam  Constitutional:       General: She is not in acute distress.  HENT:      Head: Normocephalic and atraumatic.      Right Ear: External ear normal.      Left Ear: External ear " normal.   Eyes:      Conjunctiva/sclera: Conjunctivae normal.   Cardiovascular:      Rate and Rhythm: Normal rate.   Pulmonary:      Effort: Pulmonary effort is normal. No respiratory distress.   Musculoskeletal:         General: No deformity.      Cervical back: Neck supple.      Comments: Tenderness on palpation on dorsal aspect of right wrist, pain worse with movement of fingers.  There is no redness or swelling over right wrist.  Restricted extension due to pain   Skin:     Findings: No rash.   Neurological:      Mental Status: She is alert.      Gait: Gait is intact.   Psychiatric:         Mood and Affect: Mood and affect normal.         Judgment: Judgment normal.        Assessment/Plan:    Problem List Items Addressed This Visit         Right wrist pain      New health problem, symptoms consistent with tendinitis.  Prescribed Medrol Dosepak and meloxicam.  Recommended to use gabapentin at bedtime.  Check x-ray wrist.  Referral to occupational therapy for hand therapy and a referral to orthopedics if symptoms are not getting better.        Relevant Medications    methylPREDNISolone (MEDROL DOSEPAK) 4 MG Tablet Therapy Pack    meloxicam (MOBIC) 15 MG tablet    Other Relevant Orders    DX-WRIST-COMPLETE 3+ RIGHT    Referral to Occupational Therapy    Referral to Orthopedics             Please note that this dictation was created using voice recognition software. I have made every reasonable attempt to correct obvious errors, but I expect that there are errors of grammar and possibly content that I did not discover before finalizing the note.    Follow up in 1 month for annual for Pap and for follow-up for wrist pain.

## 2022-09-09 ENCOUNTER — APPOINTMENT (OUTPATIENT)
Dept: MEDICAL GROUP | Facility: MEDICAL CENTER | Age: 47
End: 2022-09-09
Payer: COMMERCIAL

## 2022-10-11 ENCOUNTER — RESEARCH ENCOUNTER (OUTPATIENT)
Dept: MEDICAL GROUP | Facility: PHYSICIAN GROUP | Age: 47
End: 2022-10-11
Attending: FAMILY MEDICINE
Payer: COMMERCIAL

## 2022-10-11 DIAGNOSIS — Z00.6 RESEARCH STUDY PATIENT: ICD-10-CM

## 2022-11-07 LAB
APOB+LDLR+PCSK9 GENE MUT ANL BLD/T: NOT DETECTED
BRCA1+BRCA2 DEL+DUP + FULL MUT ANL BLD/T: NOT DETECTED
MLH1+MSH2+MSH6+PMS2 GN DEL+DUP+FUL M: NOT DETECTED

## 2023-02-02 DIAGNOSIS — M25.531 RIGHT WRIST PAIN: ICD-10-CM

## 2023-02-03 RX ORDER — MELOXICAM 15 MG/1
15 TABLET ORAL
Qty: 60 TABLET | Refills: 1 | Status: SHIPPED | OUTPATIENT
Start: 2023-02-03

## 2023-02-09 ENCOUNTER — HOSPITAL ENCOUNTER (OUTPATIENT)
Dept: LAB | Facility: MEDICAL CENTER | Age: 48
End: 2023-02-09
Attending: FAMILY MEDICINE
Payer: COMMERCIAL

## 2023-02-09 DIAGNOSIS — Z13.1 SCREENING FOR DIABETES MELLITUS: ICD-10-CM

## 2023-02-09 DIAGNOSIS — Z11.59 NEED FOR HEPATITIS C SCREENING TEST: ICD-10-CM

## 2023-02-09 DIAGNOSIS — Z11.4 SCREENING FOR HIV (HUMAN IMMUNODEFICIENCY VIRUS): ICD-10-CM

## 2023-02-09 DIAGNOSIS — Z13.220 SCREENING FOR LIPID DISORDERS: ICD-10-CM

## 2023-02-09 PROCEDURE — 80053 COMPREHEN METABOLIC PANEL: CPT

## 2023-02-09 PROCEDURE — 36415 COLL VENOUS BLD VENIPUNCTURE: CPT

## 2023-02-09 PROCEDURE — 87389 HIV-1 AG W/HIV-1&-2 AB AG IA: CPT

## 2023-02-09 PROCEDURE — 86803 HEPATITIS C AB TEST: CPT

## 2023-02-09 PROCEDURE — 80061 LIPID PANEL: CPT

## 2023-02-10 LAB
ALBUMIN SERPL BCP-MCNC: 4.7 G/DL (ref 3.2–4.9)
ALBUMIN/GLOB SERPL: 2 G/DL
ALP SERPL-CCNC: 44 U/L (ref 30–99)
ALT SERPL-CCNC: 14 U/L (ref 2–50)
ANION GAP SERPL CALC-SCNC: 11 MMOL/L (ref 7–16)
AST SERPL-CCNC: 25 U/L (ref 12–45)
BILIRUB SERPL-MCNC: 0.4 MG/DL (ref 0.1–1.5)
BUN SERPL-MCNC: 10 MG/DL (ref 8–22)
CALCIUM ALBUM COR SERPL-MCNC: 8.8 MG/DL (ref 8.5–10.5)
CALCIUM SERPL-MCNC: 9.4 MG/DL (ref 8.5–10.5)
CHLORIDE SERPL-SCNC: 106 MMOL/L (ref 96–112)
CHOLEST SERPL-MCNC: 192 MG/DL (ref 100–199)
CO2 SERPL-SCNC: 26 MMOL/L (ref 20–33)
CREAT SERPL-MCNC: 1.01 MG/DL (ref 0.5–1.4)
FASTING STATUS PATIENT QL REPORTED: NORMAL
GFR SERPLBLD CREATININE-BSD FMLA CKD-EPI: 68 ML/MIN/1.73 M 2
GLOBULIN SER CALC-MCNC: 2.4 G/DL (ref 1.9–3.5)
GLUCOSE SERPL-MCNC: 85 MG/DL (ref 65–99)
HCV AB SER QL: NORMAL
HDLC SERPL-MCNC: 49 MG/DL
HIV 1+2 AB+HIV1 P24 AG SERPL QL IA: NORMAL
LDLC SERPL CALC-MCNC: 124 MG/DL
POTASSIUM SERPL-SCNC: 4.2 MMOL/L (ref 3.6–5.5)
PROT SERPL-MCNC: 7.1 G/DL (ref 6–8.2)
SODIUM SERPL-SCNC: 143 MMOL/L (ref 135–145)
TRIGL SERPL-MCNC: 97 MG/DL (ref 0–149)

## 2024-03-01 ENCOUNTER — HOSPITAL ENCOUNTER (OUTPATIENT)
Dept: LAB | Facility: MEDICAL CENTER | Age: 49
End: 2024-03-01
Attending: FAMILY MEDICINE
Payer: COMMERCIAL

## 2024-03-01 ENCOUNTER — HOSPITAL ENCOUNTER (OUTPATIENT)
Dept: RADIOLOGY | Facility: MEDICAL CENTER | Age: 49
End: 2024-03-01
Attending: FAMILY MEDICINE
Payer: COMMERCIAL

## 2024-03-01 ENCOUNTER — OFFICE VISIT (OUTPATIENT)
Dept: MEDICAL GROUP | Facility: MEDICAL CENTER | Age: 49
End: 2024-03-01
Payer: COMMERCIAL

## 2024-03-01 VITALS
SYSTOLIC BLOOD PRESSURE: 110 MMHG | DIASTOLIC BLOOD PRESSURE: 60 MMHG | TEMPERATURE: 97.9 F | WEIGHT: 112.21 LBS | HEIGHT: 66 IN | HEART RATE: 90 BPM | RESPIRATION RATE: 16 BRPM | OXYGEN SATURATION: 98 % | BODY MASS INDEX: 18.03 KG/M2

## 2024-03-01 DIAGNOSIS — K92.1 BLOOD IN STOOL: ICD-10-CM

## 2024-03-01 DIAGNOSIS — R10.10 PAIN OF UPPER ABDOMEN: ICD-10-CM

## 2024-03-01 LAB
AMYLASE SERPL-CCNC: 96 U/L (ref 25–125)
ANION GAP SERPL CALC-SCNC: 7 MMOL/L (ref 7–16)
BASOPHILS # BLD AUTO: 0.9 % (ref 0–1.8)
BASOPHILS # BLD: 0.05 K/UL (ref 0–0.12)
BUN SERPL-MCNC: 14 MG/DL (ref 8–22)
CALCIUM SERPL-MCNC: 9.2 MG/DL (ref 8.4–10.2)
CHLORIDE SERPL-SCNC: 105 MMOL/L (ref 96–112)
CO2 SERPL-SCNC: 30 MMOL/L (ref 20–33)
CREAT SERPL-MCNC: 0.93 MG/DL (ref 0.5–1.4)
EOSINOPHIL # BLD AUTO: 0.09 K/UL (ref 0–0.51)
EOSINOPHIL NFR BLD: 1.6 % (ref 0–6.9)
ERYTHROCYTE [DISTWIDTH] IN BLOOD BY AUTOMATED COUNT: 44.6 FL (ref 35.9–50)
GFR SERPLBLD CREATININE-BSD FMLA CKD-EPI: 75 ML/MIN/1.73 M 2
GLUCOSE SERPL-MCNC: 90 MG/DL (ref 65–99)
HCT VFR BLD AUTO: 40.5 % (ref 37–47)
HGB BLD-MCNC: 13.5 G/DL (ref 12–16)
IMM GRANULOCYTES # BLD AUTO: 0.01 K/UL (ref 0–0.11)
IMM GRANULOCYTES NFR BLD AUTO: 0.2 % (ref 0–0.9)
LIPASE SERPL-CCNC: 94 U/L (ref 11–82)
LYMPHOCYTES # BLD AUTO: 2.47 K/UL (ref 1–4.8)
LYMPHOCYTES NFR BLD: 43.2 % (ref 22–41)
MCH RBC QN AUTO: 33 PG (ref 27–33)
MCHC RBC AUTO-ENTMCNC: 33.3 G/DL (ref 32.2–35.5)
MCV RBC AUTO: 99 FL (ref 81.4–97.8)
MONOCYTES # BLD AUTO: 0.41 K/UL (ref 0–0.85)
MONOCYTES NFR BLD AUTO: 7.2 % (ref 0–13.4)
NEUTROPHILS # BLD AUTO: 2.69 K/UL (ref 1.82–7.42)
NEUTROPHILS NFR BLD: 46.9 % (ref 44–72)
NRBC # BLD AUTO: 0 K/UL
NRBC BLD-RTO: 0 /100 WBC (ref 0–0.2)
PLATELET # BLD AUTO: 246 K/UL (ref 164–446)
PMV BLD AUTO: 10.2 FL (ref 9–12.9)
POTASSIUM SERPL-SCNC: 4 MMOL/L (ref 3.6–5.5)
RBC # BLD AUTO: 4.09 M/UL (ref 4.2–5.4)
SODIUM SERPL-SCNC: 142 MMOL/L (ref 135–145)
WBC # BLD AUTO: 5.7 K/UL (ref 4.8–10.8)

## 2024-03-01 PROCEDURE — 3078F DIAST BP <80 MM HG: CPT | Performed by: FAMILY MEDICINE

## 2024-03-01 PROCEDURE — 74177 CT ABD & PELVIS W/CONTRAST: CPT

## 2024-03-01 PROCEDURE — 99214 OFFICE O/P EST MOD 30 MIN: CPT | Performed by: FAMILY MEDICINE

## 2024-03-01 PROCEDURE — 3074F SYST BP LT 130 MM HG: CPT | Performed by: FAMILY MEDICINE

## 2024-03-01 PROCEDURE — 700117 HCHG RX CONTRAST REV CODE 255: Performed by: FAMILY MEDICINE

## 2024-03-01 PROCEDURE — 36415 COLL VENOUS BLD VENIPUNCTURE: CPT

## 2024-03-01 PROCEDURE — 82150 ASSAY OF AMYLASE: CPT

## 2024-03-01 PROCEDURE — 83690 ASSAY OF LIPASE: CPT

## 2024-03-01 PROCEDURE — 85025 COMPLETE CBC W/AUTO DIFF WBC: CPT

## 2024-03-01 PROCEDURE — 80048 BASIC METABOLIC PNL TOTAL CA: CPT

## 2024-03-01 RX ORDER — PANTOPRAZOLE SODIUM 40 MG/1
40 TABLET, DELAYED RELEASE ORAL
Qty: 60 TABLET | Refills: 1 | Status: SHIPPED | OUTPATIENT
Start: 2024-03-01

## 2024-03-01 RX ORDER — FAMOTIDINE 20 MG/1
20 TABLET, FILM COATED ORAL 2 TIMES DAILY
Qty: 120 TABLET | Refills: 1 | Status: SHIPPED | OUTPATIENT
Start: 2024-03-01

## 2024-03-01 RX ADMIN — IOHEXOL 100 ML: 350 INJECTION, SOLUTION INTRAVENOUS at 12:11

## 2024-03-01 ASSESSMENT — ENCOUNTER SYMPTOMS
ABDOMINAL PAIN: 1
DIZZINESS: 1
FEVER: 0
CONSTIPATION: 0
NAUSEA: 0
CHILLS: 0
DIARRHEA: 0
VOMITING: 0
BLOOD IN STOOL: 1

## 2024-03-01 ASSESSMENT — PATIENT HEALTH QUESTIONNAIRE - PHQ9: CLINICAL INTERPRETATION OF PHQ2 SCORE: 0

## 2024-03-01 NOTE — PROGRESS NOTES
Verbal consent was acquired by the patient to use [employer] ambient listening note generation during this visit.      Diagnoses and all orders for this visit:    Pain of upper abdomen  -     CBC WITH DIFFERENTIAL; Future  -     Cancel: Basic Metabolic Panel; Future  -     LIPASE; Future  -     AMYLASE; Future  -     CT-ABDOMEN-PELVIS WITH; Future  -     Basic Metabolic Panel; Future  -     pantoprazole (PROTONIX) 40 MG Tablet Delayed Response; Take 1 Tablet by mouth every morning on an empty stomach.  -     famotidine (PEPCID) 20 MG Tab; Take 1 Tablet by mouth 2 times a day.    Blood in stool  -     CBC WITH DIFFERENTIAL; Future  -     Cancel: Basic Metabolic Panel; Future  -     CT-ABDOMEN-PELVIS WITH; Future  -     Basic Metabolic Panel; Future  -     CULTURE STOOL; Future  -     Complete O&P; Future  -     CALPROTECTIN,FECAL; Future  -     OCCULT BLOOD FECES IMMUNOASSAY; Future                  Assessment & Plan  1. Pain in upper abdomen and blood in stools.  These are new conditions and unstable. Differential diagnosis includes gastritis, pancreatitis, infection, or other GI abnormalities. I will check CBC, BMP, lipase, and amylase levels as well as CT abdomen and pelvis to rule out these.  I ordered stool test to rule out infection also.  I will start her on pantoprazole 40 mg empty stomach and Pepcid 1 tablet 2 times daily if pantoprazole is not helping. She will avoid any spicy food, caffeine, and tomato-based food that aggravates these symptoms.  If there is significant abnormality seen on CT abdomen pelvis as a stat order then recommend patient to going to ER for further evaluation.      Follow-up  The patient will follow up in 2 to 3 weeks for follow-up for these labs and testing.  I will order mammogram and other blood test at next visit          Chief complaint::Diagnoses of Pain of upper abdomen and Blood in stool were pertinent to this visit.      History of Present Illness  The patient is a  48-year-old female who is here for abdominal pain. She is accompanied by an adult male.    The pain starts in the upper abdomen and radiates across. It hits both sides. She has pain under her ribs. Her ears periodically start ringing. She does get lightheaded when sitting or standing. It is not related to getting up too quickly. The pain has been going on for 3 weeks. It is sharp, and it hurts to sit. She denies any recent trauma. She has had diarrhea, nausea, vomiting, and 2 episodes of blood in her stools. She has not had a colonoscopy before. She rates the pain as 10 when it is sharp. She has tried Tums, which does not help. She has missed work 3 times in the last 3 weeks due to pain. Food does not seem to make a difference. She drinks a lot of water every day. She does not use drugs. She rarely takes ibuprofen. She has taken 2 ibuprofen in the past 3 weeks. She has a physical job. 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 She has not had a lot of bad headaches. She had a migraine once this year. She took ibuprofen because she was driving home. She ended up having to take Imitrex. She denies any recent travel.   The patient has no known allergies to contrast.      Review of Systems   Constitutional:  Negative for chills and fever.   Gastrointestinal:  Positive for abdominal pain and blood in stool. Negative for constipation, diarrhea, nausea and vomiting.   Neurological:  Positive for dizziness.          Medications and Allergies:     Current Outpatient Medications   Medication Sig Dispense Refill    pantoprazole (PROTONIX) 40 MG Tablet Delayed Response Take 1 Tablet by mouth every morning on an empty stomach. 60 Tablet 1    famotidine (PEPCID) 20 MG Tab Take 1 Tablet by mouth 2 times a day. 120 Tablet 1    meloxicam (MOBIC) 15 MG tablet Take 1 Tablet by mouth 1 time a day as needed for Severe Pain, Inflammation or Moderate Pain. 60 Tablet 1    ibuprofen (MOTRIN) 800 MG Tab Take 1 Tablet by mouth every  "8 hours as needed for Moderate Pain or Headache. 60 Tablet 2    sumatriptan (IMITREX) 100 MG tablet Take 1 Tab by mouth Once PRN for Migraine for up to 1 dose. 10 Tab 11     No current facility-administered medications for this visit.       /60   Pulse 90   Temp 36.6 °C (97.9 °F)   Resp 16   Ht 1.664 m (5' 5.5\")   Wt 50.9 kg (112 lb 3.4 oz)   SpO2 98% , Body mass index is 18.39 kg/m².      Physical Exam  Constitutional:       Appearance: Normal appearance. She is well-developed and well-groomed.   HENT:      Head: Normocephalic and atraumatic.      Right Ear: External ear normal.      Left Ear: External ear normal.   Eyes:      General:         Right eye: No discharge.         Left eye: No discharge.      Conjunctiva/sclera: Conjunctivae normal.   Cardiovascular:      Rate and Rhythm: Normal rate.   Pulmonary:      Effort: Pulmonary effort is normal. No respiratory distress.   Abdominal:      General: There is no distension.      Palpations: There is no mass.      Tenderness: There is abdominal tenderness. There is guarding. There is no rebound.      Comments: Significant epigastric and both sided upper abdominal tenderness on exam   Musculoskeletal:      Cervical back: Neck supple.   Skin:     Findings: No rash.   Neurological:      Mental Status: She is alert.   Psychiatric:         Mood and Affect: Mood and affect normal.         Behavior: Behavior normal.              Results            Please note that this dictation was created using voice recognition software. I have made every reasonable attempt to correct obvious errors, but I expect that there are errors of grammar and possibly content that I did not discover before finalizing the note.      "

## 2024-03-27 ENCOUNTER — APPOINTMENT (OUTPATIENT)
Dept: MEDICAL GROUP | Facility: MEDICAL CENTER | Age: 49
End: 2024-03-27
Payer: COMMERCIAL

## 2024-06-25 RX ORDER — IBUPROFEN 800 MG/1
TABLET ORAL
Qty: 60 TABLET | Refills: 2 | OUTPATIENT
Start: 2024-06-25

## 2024-07-06 RX ORDER — IBUPROFEN 800 MG/1
800 TABLET ORAL EVERY 8 HOURS PRN
Qty: 60 TABLET | Refills: 2 | Status: CANCELLED | OUTPATIENT
Start: 2024-07-06

## 2024-07-08 ENCOUNTER — PATIENT MESSAGE (OUTPATIENT)
Dept: MEDICAL GROUP | Facility: MEDICAL CENTER | Age: 49
End: 2024-07-08
Payer: COMMERCIAL

## 2024-07-08 RX ORDER — IBUPROFEN 800 MG/1
TABLET ORAL
Qty: 60 TABLET | Refills: 2 | Status: SHIPPED | OUTPATIENT
Start: 2024-07-08

## 2024-10-10 ENCOUNTER — PATIENT MESSAGE (OUTPATIENT)
Dept: HEALTH INFORMATION MANAGEMENT | Facility: OTHER | Age: 49
End: 2024-10-10

## 2025-01-31 ENCOUNTER — OFFICE VISIT (OUTPATIENT)
Dept: URGENT CARE | Facility: CLINIC | Age: 50
End: 2025-01-31
Payer: COMMERCIAL

## 2025-01-31 ENCOUNTER — APPOINTMENT (OUTPATIENT)
Dept: RADIOLOGY | Facility: MEDICAL CENTER | Age: 50
End: 2025-01-31
Attending: STUDENT IN AN ORGANIZED HEALTH CARE EDUCATION/TRAINING PROGRAM
Payer: COMMERCIAL

## 2025-01-31 ENCOUNTER — HOSPITAL ENCOUNTER (EMERGENCY)
Facility: MEDICAL CENTER | Age: 50
End: 2025-01-31
Attending: STUDENT IN AN ORGANIZED HEALTH CARE EDUCATION/TRAINING PROGRAM
Payer: COMMERCIAL

## 2025-01-31 VITALS
OXYGEN SATURATION: 98 % | TEMPERATURE: 97.8 F | WEIGHT: 115.6 LBS | RESPIRATION RATE: 16 BRPM | HEART RATE: 81 BPM | DIASTOLIC BLOOD PRESSURE: 62 MMHG | SYSTOLIC BLOOD PRESSURE: 110 MMHG | HEIGHT: 66 IN | BODY MASS INDEX: 18.58 KG/M2

## 2025-01-31 VITALS
SYSTOLIC BLOOD PRESSURE: 104 MMHG | WEIGHT: 118.83 LBS | HEART RATE: 71 BPM | BODY MASS INDEX: 19.8 KG/M2 | OXYGEN SATURATION: 95 % | HEIGHT: 65 IN | DIASTOLIC BLOOD PRESSURE: 77 MMHG | TEMPERATURE: 97.8 F | RESPIRATION RATE: 18 BRPM

## 2025-01-31 DIAGNOSIS — M79.89 PAIN AND SWELLING OF LEFT LOWER EXTREMITY: ICD-10-CM

## 2025-01-31 DIAGNOSIS — R23.1 LIVEDO RETICULARIS: ICD-10-CM

## 2025-01-31 DIAGNOSIS — M79.89 LEG SWELLING: ICD-10-CM

## 2025-01-31 DIAGNOSIS — M79.605 PAIN AND SWELLING OF LEFT LOWER EXTREMITY: ICD-10-CM

## 2025-01-31 DIAGNOSIS — I80.9 THROMBOPHLEBITIS: ICD-10-CM

## 2025-01-31 PROCEDURE — 99283 EMERGENCY DEPT VISIT LOW MDM: CPT

## 2025-01-31 PROCEDURE — 93971 EXTREMITY STUDY: CPT | Mod: LT

## 2025-01-31 RX ORDER — SUMATRIPTAN SUCCINATE 25 MG/1
100 TABLET ORAL
COMMUNITY

## 2025-01-31 RX ORDER — IBUPROFEN 600 MG/1
600 TABLET, FILM COATED ORAL EVERY 6 HOURS PRN
Qty: 30 TABLET | Refills: 0 | Status: SHIPPED | OUTPATIENT
Start: 2025-01-31

## 2025-01-31 ASSESSMENT — ENCOUNTER SYMPTOMS
FEVER: 0
PALPITATIONS: 0
CHILLS: 0
SHORTNESS OF BREATH: 0

## 2025-01-31 ASSESSMENT — FIBROSIS 4 INDEX
FIB4 SCORE: 1.39
FIB4 SCORE: 1.39

## 2025-02-01 NOTE — ED PROVIDER NOTES
"CHIEF COMPLAINT  Chief Complaint   Patient presents with    Leg Swelling     Bilat legs    Leg Pain     L calf radiating up leg per pt.    Chest Pain     \"Tightness in my chest\". Denies SOB.        LIMITATION TO HISTORY   Select: None sent in from an urgent care for evaluation of a    HPI    AIDAN WEINSTEIN is a 49 y.o. female who presents to the Emergency Department sent in from an urgent care for evaluation of left calf discoloration pain and swelling.  She noted yesterday some skin color changes to her left calf, and has had some calf tightness while she was walking today.  Was seen at an urgent care there was concern for a possible DVT thus she was sent here for further evaluation.  In triage the patient was complaining of chest pain note to me she denied complaints of chest pain stating \"I just feel anxious, denies any shortness of breath history of DVT PE, no recent travel, falls, or trauma.    OUTSIDE HISTORIAN(S):  Select: None    EXTERNAL RECORDS REVIEWED  Select: Other reviewed the urgent care note was sent in over concerns of DVT      PAST MEDICAL HISTORY  Past Medical History:   Diagnosis Date    Migraine      .    SURGICAL HISTORY  Past Surgical History:   Procedure Laterality Date    ABDOMINAL HYSTERECTOMY TOTAL      one ovary left for hormones    EYE SURGERY      PRIMARY C SECTION           FAMILY HISTORY  Family History   Problem Relation Age of Onset    Other Mother         Cervical CA    Cancer Mother         Ovarian CA, breast CA    Cancer Sister 47        Ovarian CA    Cancer Maternal Grandmother         breast CA          SOCIAL HISTORY  Social History     Socioeconomic History    Marital status:      Spouse name: Not on file    Number of children: Not on file    Years of education: Not on file    Highest education level: Not on file   Occupational History    Not on file   Tobacco Use    Smoking status: Former     Current packs/day: 1.00     Types: Cigarettes    Smokeless tobacco: " Never    Tobacco comments:     1 pack per day for 30 years   Vaping Use    Vaping status: Never Used   Substance and Sexual Activity    Alcohol use: No    Drug use: No    Sexual activity: Yes     Partners: Male     Birth control/protection: Surgical     Comment: Work for [employer]   Other Topics Concern    Not on file   Social History Narrative    Not on file     Social Drivers of Health     Financial Resource Strain: Not on file   Food Insecurity: Not on file   Transportation Needs: Not on file   Physical Activity: Not on file   Stress: Not on file   Social Connections: Not on file   Intimate Partner Violence: Low Risk  (3/1/2024)    [facility]    History of Abuse     Have you ever been afraid of, threatened, neglected, or abused by someone?: No   Housing Stability: Not on file         CURRENT MEDICATIONS  No current facility-administered medications on file prior to encounter.     Current Outpatient Medications on File Prior to Encounter   Medication Sig Dispense Refill    SUMAtriptan (IMITREX) 25 MG Tab tablet Take 100 mg by mouth.      ibuprofen (MOTRIN) 800 MG Tab TAKE ONE TABLET BY MOUTH EVERY 8 HOURS AS NEEDED FOR MODERATE PAIN OR HEADACHE 60 Tablet 2    pantoprazole (PROTONIX) 40 MG Tablet Delayed Response Take 1 Tablet by mouth every morning on an empty stomach. 60 Tablet 1    famotidine (PEPCID) 20 MG Tab Take 1 Tablet by mouth 2 times a day. 120 Tablet 1    meloxicam (MOBIC) 15 MG tablet Take 1 Tablet by mouth 1 time a day as needed for Severe Pain, Inflammation or Moderate Pain. 60 Tablet 1    sumatriptan (IMITREX) 100 MG tablet Take 1 Tab by mouth Once PRN for Migraine for up to 1 dose. 10 Tab 11           ALLERGIES  Allergies   Allergen Reactions    Demerol      Flat line    Morphine      Starts to see things     Trazodone      Hard time breathing, head aches    Vicodin [Hydrocodone-Acetaminophen]      Hives, rash        PHYSICAL EXAM  VITAL SIGNS:BP  "104/77   Pulse 71   Temp 36.6 °C (97.8 °F) (Temporal)   Resp 18   Ht 1.651 m (5' 5\")   Wt 53.9 kg (118 lb 13.3 oz)   SpO2 95%   BMI 19.77 kg/m²       VITALS - vital signs documented prior to this note have been reviewed and noted,  GENERAL - awake, alert, oriented, GCS 15, no apparent distress, non-toxic  appearing  HEENT - normocephalic, atraumatic, pupils equal, sclera anicteric, mucus  membranes moist  NECK - supple, no meningismus, full active range of motion, trachea midline  CARDIOVASCULAR - regular rate/rhythm, no murmurs/gallops/rubs  PULMONARY - no respiratory distress, speaking in full sentences, clear to  auscultation bilaterally, no wheezing/ronchi/rales, no accessory muscle use  GASTROINTESTINAL - soft, non-tender, non-distended, no rebound, guarding,  or peritonitis  GENITOURINARY - Deferred  NEUROLOGIC - Awake alert, normal mental status, speech fluid, cognition  normal, moves all extremities  MUSCULOSKELETAL - no obvious asymmetry or deformities present  EXTREMITIES -1+ pitting edema bilaterally, slightly worse on the left, left lower extremity has livedo reticularis along the left lateral calf.  Negative Homans' sign, calf compartment is soft 2+ DP PT pulses, no reproducible bony tenderness,   DIAGNOSTIC STUDIES / PROCEDURES    RADIOLOGY  I have independently interpreted the diagnostic imaging associated with this visit and am waiting the final reading from the radiologist.   My preliminary interpretation is as follows: No obvious DVT      Radiologist interpretation:   US-EXTREMITY VENOUS LOWER UNILAT LEFT   Final Result      1.  No evidence of left lower extremity deep venous thrombosis.      2.  There are no incidental findings.           COURSE & MEDICAL DECISION MAKING    ED COURSE:        INTERVENTIONS BY ME:  Medications - No data to display        INITIAL ASSESSMENT, COURSE AND PLAN  Care Narrative: Patient presented for evaluation of left skin color changes left calf swelling.  In " "triage she was complaining of chest discomfort though as she had declined EKG, and preferred to defer blood work as she stated \"I was just feeling anxious and not having any chest pain.  On examination she does have some mild swelling of her left and right lower extremity, no significant edema.  Does appear to be livedo reticularis along the lateral aspect of her left leg.  Ultrasound is obtained was negative for DVT.  Does have tenderness with palpation of the venous cords on the left perhaps represents a thrombophlebitis, will  her on NSAID use.  Instructed to return if she develops a persistent or worsening leg swelling any chest pain shortness of breath or any other new or concerning symptoms patient will return to this plan is discharged in stable condition.             ADDITIONAL PROBLEM LIST    DISPOSITION AND DISCUSSIONS      Escalation of care considered, and ultimately not performed:after discussion with the patient / family, they have elected to decline an escalation in care patient declined labs      Decision tools and prescription drugs considered including, but not limited to:  NSAIDs .    FINAL DIAGNOSIS  1. Leg swelling    2. Livedo reticularis    3. Thrombophlebitis             Electronically signed by: Jhonathan Tucker DO ,7:12 PM 01/31/25  "

## 2025-02-01 NOTE — ED TRIAGE NOTES
"Chief Complaint   Patient presents with    Leg Swelling     Bilat legs    Leg Pain     L calf radiating up leg per pt.    Chest Pain     \"Tightness in my chest\". Denies SOB.      Physical Exam  Pulmonary:      Effort: Pulmonary effort is normal.   Skin:     General: Skin is warm and dry.   Neurological:      Mental Status: She is alert.       BP 98/65   Pulse 74   Temp 36.6 °C (97.8 °F) (Temporal)   Resp 18   Ht 1.651 m (5' 5\")   Wt 53.9 kg (118 lb 13.3 oz)   SpO2 94%   BMI 19.77 kg/m²     "

## 2025-02-01 NOTE — DISCHARGE INSTRUCTIONS
If you develop any chest pain and shortness of breath significant swelling of the leg or symptoms do not improve please return for recheck try taking ibuprofen scheduled for the next few days to see if this helps with your discomfort.  Keep the legs elevated, compression stockings may help with the swelling return with other concerns

## 2025-02-01 NOTE — PROGRESS NOTES
Subjective:   AIDAN WEINSTEIN is a 49 y.o. female who presents for Edema ((L) ,bruised, pain radiates to back of (L) thigh, lightheaded, tightness in chest, feels off, just noticed last night.)      HPI:  49-year-old female who presents with left lower extremity edema skin changes to her lateral calf and pain that radiates up the back of her thigh when walking.  She also reports feeling off possibly some lightheadedness and tightness in chest on and off throughout the day.    She reports she noticed the skin changes and the pain that started last night.  She has had some bilateral lower extremity swelling that has been coming going for a few months but noticed that her left leg is now worse than her right leg that both are slightly swollen.  She reports she is very active walking throughout her day at work and standing on her feet the whole day.    She denies any feelings of palpitations.  She denies any current chest pain.  She denies any shortness of breath.    Review of Systems   Constitutional:  Negative for chills and fever.   Respiratory:  Negative for shortness of breath.    Cardiovascular:  Positive for leg swelling. Negative for chest pain and palpitations.       Medications:    famotidine Tabs  ibuprofen Tabs  meloxicam  pantoprazole Tbec  sumatriptan  SUMAtriptan Tabs    Allergies: Demerol, Morphine, Trazodone, and Vicodin [hydrocodone-acetaminophen]    Problem List: AIDAN WEINSTEIN does not have any pertinent problems on file.    Surgical History:  Past Surgical History:   Procedure Laterality Date    ABDOMINAL HYSTERECTOMY TOTAL      one ovary left for hormones    EYE SURGERY      PRIMARY C SECTION         Past Social Hx: AIDAN WEINSTEIN  reports that she has been smoking cigarettes. She has never used smokeless tobacco. She reports that she does not drink alcohol and does not use drugs.     Past Family Hx:  AIDAN WEINSTEIN family history includes Cancer in her maternal  "grandmother and mother; Cancer (age of onset: 47) in her sister; Other in her mother.     Problem list, medications, and allergies reviewed by myself today in Epic.     Objective:     /62 (BP Location: Left arm, Patient Position: Sitting, BP Cuff Size: Large adult long)   Pulse 81   Temp 36.6 °C (97.8 °F) (Temporal)   Resp 16   Ht 1.664 m (5' 5.5\")   Wt 52.4 kg (115 lb 9.6 oz)   SpO2 98%   BMI 18.94 kg/m²     Physical Exam  Constitutional:       Appearance: Normal appearance.   HENT:      Head: Normocephalic and atraumatic.   Cardiovascular:      Rate and Rhythm: Normal rate and regular rhythm.      Pulses: Normal pulses.      Heart sounds: Murmur heard.   Pulmonary:      Effort: Pulmonary effort is normal. No respiratory distress.      Breath sounds: Normal breath sounds. No wheezing or rhonchi.   Musculoskeletal:      Left lower leg: No tenderness. Edema present.      Right ankle: No tenderness. Normal pulse.      Left ankle: No tenderness. Normal pulse.      Comments: Left lower extremity slightly more pitting edema than the right lower extremity.  Left lateral calf with possible superficial collateral veins present.  On ambulation patient reports some tenderness in her posterior thigh.   Neurological:      Mental Status: She is alert.         Assessment/Plan:     Diagnosis and associated orders:     1. Pain and swelling of left lower extremity           Comments/MDM:     1. Pain and swelling of left lower extremity  Patient presents with concern of left lower extremity swelling as compared to right.  As well as some skin changes on the lateral aspect of her left calf.    Wells DVT criteria with moderate to high risk.  Left lower extremity swelling, possible superficial veins none varicose, posterior leg tenderness, pitting edema.    -Unable to get outpatient ultrasound evaluation this evening.  And given her episodic chest tightness and reporting feeling off as well as this new onset left lower " extremity discrepancies I recommend evaluation at the [facility] emergency department for further workup of possible DVT.  - [facility] transfer center called and notified of patient's transfer by own vehicle to [facility].  - Patient is agreeable with workup plan at this time and is agreeable with transfer to the ER.             Differential diagnosis, natural history, supportive care, and indications for immediate follow-up discussed.    Advised the patient to follow-up with the primary care physician for recheck, reevaluation, and consideration of further management.    Please note that this dictation was created using voice recognition software. I have made a reasonable attempt to correct obvious errors, but I expect that there are errors of grammar and possibly content that I did not discover before finalizing the note.    Benedicto Lopez M.D.

## 2025-02-04 ENCOUNTER — APPOINTMENT (OUTPATIENT)
Dept: MEDICAL GROUP | Facility: MEDICAL CENTER | Age: 50
End: 2025-02-04
Payer: COMMERCIAL

## 2025-03-22 SDOH — ECONOMIC STABILITY: FOOD INSECURITY: WITHIN THE PAST 12 MONTHS, YOU WORRIED THAT YOUR FOOD WOULD RUN OUT BEFORE YOU GOT MONEY TO BUY MORE.: NEVER TRUE

## 2025-03-22 SDOH — ECONOMIC STABILITY: TRANSPORTATION INSECURITY
IN THE PAST 12 MONTHS, HAS THE LACK OF TRANSPORTATION KEPT YOU FROM MEDICAL APPOINTMENTS OR FROM GETTING MEDICATIONS?: NO

## 2025-03-22 SDOH — HEALTH STABILITY: PHYSICAL HEALTH: ON AVERAGE, HOW MANY DAYS PER WEEK DO YOU ENGAGE IN MODERATE TO STRENUOUS EXERCISE (LIKE A BRISK WALK)?: 5 DAYS

## 2025-03-22 SDOH — ECONOMIC STABILITY: FOOD INSECURITY: WITHIN THE PAST 12 MONTHS, THE FOOD YOU BOUGHT JUST DIDN'T LAST AND YOU DIDN'T HAVE MONEY TO GET MORE.: NEVER TRUE

## 2025-03-22 SDOH — ECONOMIC STABILITY: INCOME INSECURITY: HOW HARD IS IT FOR YOU TO PAY FOR THE VERY BASICS LIKE FOOD, HOUSING, MEDICAL CARE, AND HEATING?: NOT VERY HARD

## 2025-03-22 SDOH — HEALTH STABILITY: PHYSICAL HEALTH: ON AVERAGE, HOW MANY MINUTES DO YOU ENGAGE IN EXERCISE AT THIS LEVEL?: 150+ MIN

## 2025-03-22 SDOH — ECONOMIC STABILITY: INCOME INSECURITY: IN THE LAST 12 MONTHS, WAS THERE A TIME WHEN YOU WERE NOT ABLE TO PAY THE MORTGAGE OR RENT ON TIME?: NO

## 2025-03-22 SDOH — ECONOMIC STABILITY: TRANSPORTATION INSECURITY
IN THE PAST 12 MONTHS, HAS LACK OF TRANSPORTATION KEPT YOU FROM MEETINGS, WORK, OR FROM GETTING THINGS NEEDED FOR DAILY LIVING?: NO

## 2025-03-22 SDOH — ECONOMIC STABILITY: HOUSING INSECURITY
IN THE LAST 12 MONTHS, WAS THERE A TIME WHEN YOU DID NOT HAVE A STEADY PLACE TO SLEEP OR SLEPT IN A SHELTER (INCLUDING NOW)?: NO

## 2025-03-22 SDOH — HEALTH STABILITY: MENTAL HEALTH
STRESS IS WHEN SOMEONE FEELS TENSE, NERVOUS, ANXIOUS, OR CAN'T SLEEP AT NIGHT BECAUSE THEIR MIND IS TROUBLED. HOW STRESSED ARE YOU?: TO SOME EXTENT

## 2025-03-22 SDOH — ECONOMIC STABILITY: TRANSPORTATION INSECURITY
IN THE PAST 12 MONTHS, HAS LACK OF RELIABLE TRANSPORTATION KEPT YOU FROM MEDICAL APPOINTMENTS, MEETINGS, WORK OR FROM GETTING THINGS NEEDED FOR DAILY LIVING?: NO

## 2025-03-22 ASSESSMENT — SOCIAL DETERMINANTS OF HEALTH (SDOH)
HOW HARD IS IT FOR YOU TO PAY FOR THE VERY BASICS LIKE FOOD, HOUSING, MEDICAL CARE, AND HEATING?: NOT VERY HARD
HOW MANY DRINKS CONTAINING ALCOHOL DO YOU HAVE ON A TYPICAL DAY WHEN YOU ARE DRINKING: PATIENT DOES NOT DRINK
HOW OFTEN DO YOU ATTEND CHURCH OR RELIGIOUS SERVICES?: NEVER
HOW OFTEN DO YOU GET TOGETHER WITH FRIENDS OR RELATIVES?: NEVER
DO YOU BELONG TO ANY CLUBS OR ORGANIZATIONS SUCH AS CHURCH GROUPS UNIONS, FRATERNAL OR ATHLETIC GROUPS, OR SCHOOL GROUPS?: NO
HOW OFTEN DO YOU HAVE A DRINK CONTAINING ALCOHOL: NEVER
IN A TYPICAL WEEK, HOW MANY TIMES DO YOU TALK ON THE PHONE WITH FAMILY, FRIENDS, OR NEIGHBORS?: NEVER
IN A TYPICAL WEEK, HOW MANY TIMES DO YOU TALK ON THE PHONE WITH FAMILY, FRIENDS, OR NEIGHBORS?: NEVER
HOW OFTEN DO YOU GET TOGETHER WITH FRIENDS OR RELATIVES?: NEVER
HOW OFTEN DO YOU ATTEND CHURCH OR RELIGIOUS SERVICES?: NEVER
HOW OFTEN DO YOU HAVE SIX OR MORE DRINKS ON ONE OCCASION: NEVER
DO YOU BELONG TO ANY CLUBS OR ORGANIZATIONS SUCH AS CHURCH GROUPS UNIONS, FRATERNAL OR ATHLETIC GROUPS, OR SCHOOL GROUPS?: NO
HOW OFTEN DO YOU ATTEND CHURCH OR RELIGIOUS SERVICES?: NEVER
HOW OFTEN DO YOU ATTENT MEETINGS OF THE CLUB OR ORGANIZATION YOU BELONG TO?: NEVER
IN THE PAST 12 MONTHS, HAS THE ELECTRIC, GAS, OIL, OR WATER COMPANY THREATENED TO SHUT OFF SERVICE IN YOUR HOME?: NO
HOW OFTEN DO YOU ATTENT MEETINGS OF THE CLUB OR ORGANIZATION YOU BELONG TO?: NEVER
HOW OFTEN DO YOU ATTENT MEETINGS OF THE CLUB OR ORGANIZATION YOU BELONG TO?: NEVER
HOW OFTEN DO YOU GET TOGETHER WITH FRIENDS OR RELATIVES?: NEVER
IN THE PAST 12 MONTHS, HAS THE ELECTRIC, GAS, OIL, OR WATER COMPANY THREATENED TO SHUT OFF SERVICE IN YOUR HOME?: NO
IN A TYPICAL WEEK, HOW MANY TIMES DO YOU TALK ON THE PHONE WITH FAMILY, FRIENDS, OR NEIGHBORS?: NEVER
WITHIN THE PAST 12 MONTHS, YOU WORRIED THAT YOUR FOOD WOULD RUN OUT BEFORE YOU GOT THE MONEY TO BUY MORE: NEVER TRUE
DO YOU BELONG TO ANY CLUBS OR ORGANIZATIONS SUCH AS CHURCH GROUPS UNIONS, FRATERNAL OR ATHLETIC GROUPS, OR SCHOOL GROUPS?: NO

## 2025-03-22 ASSESSMENT — LIFESTYLE VARIABLES
SKIP TO QUESTIONS 9-10: 1
HOW OFTEN DO YOU HAVE A DRINK CONTAINING ALCOHOL: NEVER
AUDIT-C TOTAL SCORE: 0
HOW OFTEN DO YOU HAVE SIX OR MORE DRINKS ON ONE OCCASION: NEVER
HOW MANY STANDARD DRINKS CONTAINING ALCOHOL DO YOU HAVE ON A TYPICAL DAY: PATIENT DOES NOT DRINK
AUDIT-C TOTAL SCORE: 0
HOW OFTEN DO YOU HAVE SIX OR MORE DRINKS ON ONE OCCASION: NEVER
HOW MANY STANDARD DRINKS CONTAINING ALCOHOL DO YOU HAVE ON A TYPICAL DAY: PATIENT DOES NOT DRINK
SKIP TO QUESTIONS 9-10: 1
HOW OFTEN DO YOU HAVE A DRINK CONTAINING ALCOHOL: NEVER

## 2025-03-25 ENCOUNTER — APPOINTMENT (OUTPATIENT)
Dept: MEDICAL GROUP | Facility: LAB | Age: 50
End: 2025-03-25
Payer: COMMERCIAL

## 2025-03-25 SDOH — HEALTH STABILITY: PHYSICAL HEALTH: ON AVERAGE, HOW MANY DAYS PER WEEK DO YOU ENGAGE IN MODERATE TO STRENUOUS EXERCISE (LIKE A BRISK WALK)?: 5 DAYS

## 2025-03-25 SDOH — ECONOMIC STABILITY: FOOD INSECURITY: WITHIN THE PAST 12 MONTHS, YOU WORRIED THAT YOUR FOOD WOULD RUN OUT BEFORE YOU GOT MONEY TO BUY MORE.: NEVER TRUE

## 2025-03-25 SDOH — ECONOMIC STABILITY: INCOME INSECURITY: HOW HARD IS IT FOR YOU TO PAY FOR THE VERY BASICS LIKE FOOD, HOUSING, MEDICAL CARE, AND HEATING?: NOT VERY HARD

## 2025-03-25 SDOH — ECONOMIC STABILITY: INCOME INSECURITY: IN THE LAST 12 MONTHS, WAS THERE A TIME WHEN YOU WERE NOT ABLE TO PAY THE MORTGAGE OR RENT ON TIME?: NO

## 2025-03-25 SDOH — ECONOMIC STABILITY: FOOD INSECURITY: WITHIN THE PAST 12 MONTHS, THE FOOD YOU BOUGHT JUST DIDN'T LAST AND YOU DIDN'T HAVE MONEY TO GET MORE.: NEVER TRUE

## 2025-03-25 SDOH — HEALTH STABILITY: PHYSICAL HEALTH: ON AVERAGE, HOW MANY MINUTES DO YOU ENGAGE IN EXERCISE AT THIS LEVEL?: 150+ MIN

## 2025-03-25 ASSESSMENT — SOCIAL DETERMINANTS OF HEALTH (SDOH)
HOW OFTEN DO YOU ATTENT MEETINGS OF THE CLUB OR ORGANIZATION YOU BELONG TO?: NEVER
IN A TYPICAL WEEK, HOW MANY TIMES DO YOU TALK ON THE PHONE WITH FAMILY, FRIENDS, OR NEIGHBORS?: NEVER
HOW OFTEN DO YOU ATTEND CHURCH OR RELIGIOUS SERVICES?: NEVER
IN THE PAST 12 MONTHS, HAS THE ELECTRIC, GAS, OIL, OR WATER COMPANY THREATENED TO SHUT OFF SERVICE IN YOUR HOME?: NO
HOW MANY DRINKS CONTAINING ALCOHOL DO YOU HAVE ON A TYPICAL DAY WHEN YOU ARE DRINKING: PATIENT DOES NOT DRINK
HOW OFTEN DO YOU ATTEND CHURCH OR RELIGIOUS SERVICES?: NEVER
HOW OFTEN DO YOU GET TOGETHER WITH FRIENDS OR RELATIVES?: NEVER
HOW OFTEN DO YOU HAVE A DRINK CONTAINING ALCOHOL: NEVER
HOW OFTEN DO YOU ATTENT MEETINGS OF THE CLUB OR ORGANIZATION YOU BELONG TO?: NEVER
HOW OFTEN DO YOU HAVE SIX OR MORE DRINKS ON ONE OCCASION: NEVER
HOW HARD IS IT FOR YOU TO PAY FOR THE VERY BASICS LIKE FOOD, HOUSING, MEDICAL CARE, AND HEATING?: NOT VERY HARD
DO YOU BELONG TO ANY CLUBS OR ORGANIZATIONS SUCH AS CHURCH GROUPS UNIONS, FRATERNAL OR ATHLETIC GROUPS, OR SCHOOL GROUPS?: NO
WITHIN THE PAST 12 MONTHS, YOU WORRIED THAT YOUR FOOD WOULD RUN OUT BEFORE YOU GOT THE MONEY TO BUY MORE: NEVER TRUE
HOW OFTEN DO YOU GET TOGETHER WITH FRIENDS OR RELATIVES?: NEVER
DO YOU BELONG TO ANY CLUBS OR ORGANIZATIONS SUCH AS CHURCH GROUPS UNIONS, FRATERNAL OR ATHLETIC GROUPS, OR SCHOOL GROUPS?: NO
IN A TYPICAL WEEK, HOW MANY TIMES DO YOU TALK ON THE PHONE WITH FAMILY, FRIENDS, OR NEIGHBORS?: NEVER

## 2025-03-25 ASSESSMENT — LIFESTYLE VARIABLES
HOW OFTEN DO YOU HAVE SIX OR MORE DRINKS ON ONE OCCASION: NEVER
HOW OFTEN DO YOU HAVE A DRINK CONTAINING ALCOHOL: NEVER
HOW MANY STANDARD DRINKS CONTAINING ALCOHOL DO YOU HAVE ON A TYPICAL DAY: PATIENT DOES NOT DRINK
SKIP TO QUESTIONS 9-10: 1
AUDIT-C TOTAL SCORE: 0

## 2025-03-27 ENCOUNTER — HOSPITAL ENCOUNTER (OUTPATIENT)
Facility: MEDICAL CENTER | Age: 50
End: 2025-03-27
Attending: NURSE PRACTITIONER
Payer: COMMERCIAL

## 2025-03-27 ENCOUNTER — OFFICE VISIT (OUTPATIENT)
Dept: MEDICAL GROUP | Facility: LAB | Age: 50
End: 2025-03-27
Payer: COMMERCIAL

## 2025-03-27 VITALS
RESPIRATION RATE: 14 BRPM | HEART RATE: 60 BPM | TEMPERATURE: 97.8 F | HEIGHT: 65 IN | BODY MASS INDEX: 19.63 KG/M2 | DIASTOLIC BLOOD PRESSURE: 56 MMHG | WEIGHT: 117.8 LBS | SYSTOLIC BLOOD PRESSURE: 108 MMHG | OXYGEN SATURATION: 96 %

## 2025-03-27 DIAGNOSIS — N10 ACUTE PYELONEPHRITIS: ICD-10-CM

## 2025-03-27 DIAGNOSIS — R10.9 FLANK PAIN: ICD-10-CM

## 2025-03-27 DIAGNOSIS — R19.5 POSITIVE COLORECTAL CANCER SCREENING USING COLOGUARD TEST: ICD-10-CM

## 2025-03-27 DIAGNOSIS — Z79.890 HORMONE REPLACEMENT THERAPY (HRT): ICD-10-CM

## 2025-03-27 DIAGNOSIS — Z00.00 PREVENTATIVE HEALTH CARE: ICD-10-CM

## 2025-03-27 DIAGNOSIS — Z12.31 ENCOUNTER FOR SCREENING MAMMOGRAM FOR BREAST CANCER: ICD-10-CM

## 2025-03-27 PROBLEM — K13.70 ORAL LESION: Status: RESOLVED | Noted: 2020-06-05 | Resolved: 2025-03-27

## 2025-03-27 LAB
APPEARANCE UR: NORMAL
BILIRUB UR STRIP-MCNC: NEGATIVE MG/DL
COLOR UR AUTO: YELLOW
GLUCOSE UR STRIP.AUTO-MCNC: NEGATIVE MG/DL
KETONES UR STRIP.AUTO-MCNC: NEGATIVE MG/DL
LEUKOCYTE ESTERASE UR QL STRIP.AUTO: NORMAL
NITRITE UR QL STRIP.AUTO: POSITIVE
PH UR STRIP.AUTO: 6.5 [PH] (ref 5–8)
PROT UR QL STRIP: NEGATIVE MG/DL
RBC UR QL AUTO: NORMAL
SP GR UR STRIP.AUTO: 1.01
UROBILINOGEN UR STRIP-MCNC: 0.2 MG/DL

## 2025-03-27 PROCEDURE — 87086 URINE CULTURE/COLONY COUNT: CPT

## 2025-03-27 PROCEDURE — 87186 SC STD MICRODIL/AGAR DIL: CPT

## 2025-03-27 PROCEDURE — 87077 CULTURE AEROBIC IDENTIFY: CPT

## 2025-03-27 RX ORDER — SULFAMETHOXAZOLE AND TRIMETHOPRIM 800; 160 MG/1; MG/1
1 TABLET ORAL 2 TIMES DAILY
Qty: 20 TABLET | Refills: 0 | Status: SHIPPED | OUTPATIENT
Start: 2025-03-27 | End: 2025-04-06

## 2025-03-27 RX ORDER — SULFAMETHOXAZOLE AND TRIMETHOPRIM 800; 160 MG/1; MG/1
1 TABLET ORAL 2 TIMES DAILY
Qty: 10 TABLET | Refills: 0 | Status: SHIPPED | OUTPATIENT
Start: 2025-03-27 | End: 2025-03-27

## 2025-03-27 RX ORDER — ESTRADIOL 0.05 MG/D
1 PATCH TRANSDERMAL
COMMUNITY
Start: 2025-02-19

## 2025-03-27 ASSESSMENT — PATIENT HEALTH QUESTIONNAIRE - PHQ9: CLINICAL INTERPRETATION OF PHQ2 SCORE: 0

## 2025-03-27 NOTE — LETTER
[facility]  DOUGLAS Alvarenga  [address]  [place] [address]  Fax: [phone]   Authorization for Release/Disclosure of   Protected Health Information   Name: SAVITA WEINSTEIN : 1975 SSN: [id number]   Address: [address] [place] [address] [address] [place] [address] Phone:    There are no phone numbers on file.   I authorize the entity listed below to release/disclose the PHI below to:   [facility]/DOUGLAS Alvarenga and DOUGLAS Alvarenga   Provider or Entity Name:  [facility]   Address   City, [facility], [place], [place] Phone:      Fax:     Reason for request: continuity of care   Information to be released:    [  ] LAST COLONOSCOPY,  including any PATH REPORT and follow-up  [  ] LAST FIT/COLOGUARD RESULT [  ] LAST DEXA  [  ] LAST MAMMOGRAM  [  ] LAST PAP  [  ] LAST LABS [  ] RETINA EXAM REPORT  [  ] IMMUNIZATION RECORDS  [xx  ] Release all info      [  ] Check here and initial the line next to each item to release ALL health information INCLUDING  _____ Care and treatment for drug and / or alcohol abuse  _____ HIV testing, infection status, or AIDS  _____ Genetic Testing    DATES OF SERVICE OR TIME PERIOD TO BE DISCLOSED: _____________  I understand and acknowledge that:  * This Authorization may be revoked at any time by you in writing, except if your health information has already been used or disclosed.  * Your health information that will be used or disclosed as a result of you signing this authorization could be re-disclosed by the recipient. If this occurs, your re-disclosed health information may no longer be protected by State or Federal laws.  * You may refuse to sign this Authorization. Your refusal will not affect your ability to obtain treatment.  * This Authorization becomes effective upon signing and will  on (date) __________.      If no date is indicated, this Authorization will  one (1) year from the signature date.    Name: Savita  Johnathan Russell  Signature: Date:   3/27/2025     PLEASE FAX REQUESTED RECORDS BACK TO: [phone]

## 2025-03-27 NOTE — PROGRESS NOTES
Chief Complaint   Patient presents with    [facility]    Rib Pain     R side / under breast / throbbing / dizzy / lightheaded      Verbal consent was acquired by the patient to use [employer] ambient listening note generation during this visit Yes      HPI:  History of Present Illness  The patient presents to [facility] and for evaluation of flank pain, blood in stool, and hormone replacement therapy.    She reports experiencing severe, pulsating pain in her right flank region, which is exacerbated by sitting. The onset of this pain was approximately 3 weeks ago. She describes the sensation as if an internal organ is attempting to protrude from both the anterior and posterior aspects of her ribs. The pain is not associated with any specific activities such as eating or drinking, and she does not experience dysuria or hematuria. She has no history of renal calculi. The affected area is tender to palpation but not excessively so. The pain is constant and does not subside. She has not attempted self-medication with over-the-counter analgesics such as ibuprofen or Tylenol due to uncertainty about the cause of the pain. She reports no systemic symptoms such as fever, chills, nausea, or vomiting.    She underwent a Cologuard test last month at [facility], which yielded abnormal results. Subsequently, she performed an over-the-counter [place] test, which indicated the presence of blood in her stool. She is uncertain about the necessity of a colonoscopy and is seeking advice on alternative diagnostic methods. She does not believe she has hemorrhoids.    She has been prescribed a hormone patch by her gynecologist, which has effectively managed her hot flashes. She has not undergone a mammogram in the past decade.    SOCIAL HISTORY  She runs a warehouse.    FAMILY HISTORY  She has a family history of breast cancer.    ALLERGIES  The patient has no known allergies to antibiotics.    IMMUNIZATIONS  She has had  "every COVID-19 vaccine, varicella vaccine, and influenza vaccine.    Results:  Results  Laboratory Studies  Urinalysis indicates a urinary tract infection.    ROS:  As documented in history of present illness above    Exam:   /56 (BP Location: Right arm, Patient Position: Sitting, BP Cuff Size: Adult)   Pulse 60   Temp 36.6 °C (97.8 °F)   Resp 14   Ht 1.651 m (5' 5\")   Wt 53.4 kg (117 lb 12.8 oz)   SpO2 96%   BMI 19.60 kg/m²     Constitutional: Alert, no distress, well-groomed.  Skin: Warm, dry, good turgor, no rashes in visible areas.  Eye: Equal, round and reactive, conjunctiva clear, lids normal.  ENMT: Lips without lesions, good dentition, moist mucous membranes.  Neck: Trachea midline, no masses, no thyromegaly.  Respiratory: Unlabored respiratory effort, no cough.  MSK: Normal gait, moves all extremities.  Neuro: Grossly non-focal.   Psych: Alert and oriented x3, normal affect and mood.    Assessment / Plan:  Assessment & Plan  1. Flank pain  2. Pyelonephritis.  Her symptoms suggest a urinary tract infection, which may have progressed to a kidney infection, likely causing her pain. Ciprofloxacin 500 mg twice daily for 7 days was initially considered but not prescribed due to her physically demanding job and the risk of tendon rupture. Instead, an injection Rocephin and prescription of Bactrim will be administered today. If pain persists after completing the antibiotic course, a CT scan of the kidneys will be ordered.    3.  Positive colorectal cancer screening using Cologuard test  She reported an abnormal Cologuard test and an over-the-counter test indicating blood in the stool. A colonoscopy is recommended as the next step, but she declines at this time. If she decides to proceed, she should send a message to place the referral.    4. Hormone replacement therapy.  She is currently on a hormone patch, which has alleviated her hot flashes.     5.  Preventative health care  6.  Encounter for " screening mammogram for breast cancer  Basic labs will be ordered, to be drawn at least 2 weeks after completing the antibiotic course. She should fast for 8 to 10 hours before the lab draw. A mammogram will also be scheduled due to her family history of breast cancer and her initiation of hormone replacement therapy.    Please note that this dictation was created using voice recognition software. I have made every reasonable attempt to correct obvious errors, but I expect that there are errors of grammar and possibly content that I did not discover before finalizing the note.

## 2025-03-28 ENCOUNTER — PATIENT MESSAGE (OUTPATIENT)
Dept: MEDICAL GROUP | Facility: LAB | Age: 50
End: 2025-03-28
Payer: COMMERCIAL

## 2025-03-28 DIAGNOSIS — Z12.11 COLON CANCER SCREENING: ICD-10-CM

## 2025-03-30 LAB
BACTERIA UR CULT: ABNORMAL
BACTERIA UR CULT: ABNORMAL
SIGNIFICANT IND 70042: ABNORMAL
SITE SITE: ABNORMAL
SOURCE SOURCE: ABNORMAL

## 2025-03-31 ENCOUNTER — RESULTS FOLLOW-UP (OUTPATIENT)
Dept: MEDICAL GROUP | Facility: LAB | Age: 50
End: 2025-03-31
Payer: COMMERCIAL

## 2025-04-07 ENCOUNTER — APPOINTMENT (OUTPATIENT)
Dept: RADIOLOGY | Facility: MEDICAL CENTER | Age: 50
End: 2025-04-07
Attending: NURSE PRACTITIONER
Payer: COMMERCIAL

## 2025-04-07 DIAGNOSIS — Z12.31 ENCOUNTER FOR SCREENING MAMMOGRAM FOR BREAST CANCER: ICD-10-CM

## 2025-04-07 PROCEDURE — 77067 SCR MAMMO BI INCL CAD: CPT

## 2025-04-09 ENCOUNTER — RESULTS FOLLOW-UP (OUTPATIENT)
Dept: MEDICAL GROUP | Facility: LAB | Age: 50
End: 2025-04-09
Payer: COMMERCIAL

## 2025-04-18 ENCOUNTER — HOSPITAL ENCOUNTER (OUTPATIENT)
Facility: MEDICAL CENTER | Age: 50
End: 2025-04-18
Attending: NURSE PRACTITIONER
Payer: COMMERCIAL

## 2025-04-18 DIAGNOSIS — Z00.00 PREVENTATIVE HEALTH CARE: ICD-10-CM

## 2025-04-18 DIAGNOSIS — R10.9 FLANK PAIN: ICD-10-CM

## 2025-04-18 LAB
25(OH)D3 SERPL-MCNC: 29 NG/ML (ref 30–100)
ALBUMIN SERPL BCP-MCNC: 4.3 G/DL (ref 3.2–4.9)
ALBUMIN/GLOB SERPL: 1.9 G/DL
ALP SERPL-CCNC: 40 U/L (ref 30–99)
ALT SERPL-CCNC: 37 U/L (ref 2–50)
ANION GAP SERPL CALC-SCNC: 10 MMOL/L (ref 7–16)
AST SERPL-CCNC: 35 U/L (ref 12–45)
BASOPHILS # BLD AUTO: 0.8 % (ref 0–1.8)
BASOPHILS # BLD: 0.04 K/UL (ref 0–0.12)
BILIRUB SERPL-MCNC: 0.5 MG/DL (ref 0.1–1.5)
BUN SERPL-MCNC: 11 MG/DL (ref 8–22)
CALCIUM ALBUM COR SERPL-MCNC: 8.7 MG/DL (ref 8.5–10.5)
CALCIUM SERPL-MCNC: 8.9 MG/DL (ref 8.5–10.5)
CHLORIDE SERPL-SCNC: 103 MMOL/L (ref 96–112)
CHOLEST SERPL-MCNC: 187 MG/DL (ref 100–199)
CO2 SERPL-SCNC: 26 MMOL/L (ref 20–33)
CREAT SERPL-MCNC: 0.95 MG/DL (ref 0.5–1.4)
EOSINOPHIL # BLD AUTO: 0.07 K/UL (ref 0–0.51)
EOSINOPHIL NFR BLD: 1.3 % (ref 0–6.9)
ERYTHROCYTE [DISTWIDTH] IN BLOOD BY AUTOMATED COUNT: 43.8 FL (ref 35.9–50)
EST. AVERAGE GLUCOSE BLD GHB EST-MCNC: 123 MG/DL
FASTING STATUS PATIENT QL REPORTED: NORMAL
GFR SERPLBLD CREATININE-BSD FMLA CKD-EPI: 72 ML/MIN/1.73 M 2
GLOBULIN SER CALC-MCNC: 2.3 G/DL (ref 1.9–3.5)
GLUCOSE SERPL-MCNC: 80 MG/DL (ref 65–99)
HBA1C MFR BLD: 5.9 % (ref 4–5.6)
HCT VFR BLD AUTO: 38.1 % (ref 37–47)
HDLC SERPL-MCNC: 61 MG/DL
HGB BLD-MCNC: 12.7 G/DL (ref 12–16)
IMM GRANULOCYTES # BLD AUTO: 0.01 K/UL (ref 0–0.11)
IMM GRANULOCYTES NFR BLD AUTO: 0.2 % (ref 0–0.9)
LDLC SERPL CALC-MCNC: 116 MG/DL
LYMPHOCYTES # BLD AUTO: 2.56 K/UL (ref 1–4.8)
LYMPHOCYTES NFR BLD: 49.2 % (ref 22–41)
MCH RBC QN AUTO: 32.6 PG (ref 27–33)
MCHC RBC AUTO-ENTMCNC: 33.3 G/DL (ref 32.2–35.5)
MCV RBC AUTO: 97.7 FL (ref 81.4–97.8)
MONOCYTES # BLD AUTO: 0.35 K/UL (ref 0–0.85)
MONOCYTES NFR BLD AUTO: 6.7 % (ref 0–13.4)
NEUTROPHILS # BLD AUTO: 2.17 K/UL (ref 1.82–7.42)
NEUTROPHILS NFR BLD: 41.8 % (ref 44–72)
NRBC # BLD AUTO: 0 K/UL
NRBC BLD-RTO: 0 /100 WBC (ref 0–0.2)
PLATELET # BLD AUTO: 249 K/UL (ref 164–446)
PMV BLD AUTO: 9.9 FL (ref 9–12.9)
POTASSIUM SERPL-SCNC: 3.8 MMOL/L (ref 3.6–5.5)
PROT SERPL-MCNC: 6.6 G/DL (ref 6–8.2)
RBC # BLD AUTO: 3.9 M/UL (ref 4.2–5.4)
SODIUM SERPL-SCNC: 139 MMOL/L (ref 135–145)
TRIGL SERPL-MCNC: 49 MG/DL (ref 0–149)
TSH SERPL DL<=0.005 MIU/L-ACNC: 1.23 UIU/ML (ref 0.38–5.33)
WBC # BLD AUTO: 5.2 K/UL (ref 4.8–10.8)

## 2025-04-18 PROCEDURE — 84443 ASSAY THYROID STIM HORMONE: CPT

## 2025-04-18 PROCEDURE — 85025 COMPLETE CBC W/AUTO DIFF WBC: CPT

## 2025-04-18 PROCEDURE — 80061 LIPID PANEL: CPT

## 2025-04-18 PROCEDURE — 83036 HEMOGLOBIN GLYCOSYLATED A1C: CPT

## 2025-04-18 PROCEDURE — 80053 COMPREHEN METABOLIC PANEL: CPT

## 2025-04-18 PROCEDURE — 82306 VITAMIN D 25 HYDROXY: CPT

## 2025-04-18 PROCEDURE — 36415 COLL VENOUS BLD VENIPUNCTURE: CPT

## 2025-04-22 ENCOUNTER — RESULTS FOLLOW-UP (OUTPATIENT)
Dept: MEDICAL GROUP | Facility: LAB | Age: 50
End: 2025-04-22
Payer: COMMERCIAL